# Patient Record
Sex: FEMALE | Race: WHITE | NOT HISPANIC OR LATINO | Employment: FULL TIME | URBAN - METROPOLITAN AREA
[De-identification: names, ages, dates, MRNs, and addresses within clinical notes are randomized per-mention and may not be internally consistent; named-entity substitution may affect disease eponyms.]

---
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
## 2024-02-05 ENCOUNTER — TELEPHONE (OUTPATIENT)
Dept: GASTROENTEROLOGY | Facility: CLINIC | Age: 19
End: 2024-02-05
Payer: COMMERCIAL

## 2024-02-05 NOTE — TELEPHONE ENCOUNTER
I called patient and informed her García from [facility] has been trying to reach her concerning order for Dupixent. I gave her Bola contact number at [phone] and instructed her to call him. Patient verbalized understanding.

## 2024-03-08 ENCOUNTER — OFFICE VISIT (OUTPATIENT)
Dept: FAMILY MEDICINE CLINIC | Facility: CLINIC | Age: 19
End: 2024-03-08
Payer: COMMERCIAL

## 2024-03-08 VITALS
BODY MASS INDEX: 23.32 KG/M2 | TEMPERATURE: 98.4 F | WEIGHT: 136.6 LBS | HEIGHT: 64 IN | DIASTOLIC BLOOD PRESSURE: 60 MMHG | OXYGEN SATURATION: 99 % | HEART RATE: 97 BPM | SYSTOLIC BLOOD PRESSURE: 122 MMHG

## 2024-03-08 DIAGNOSIS — K20.0 EOSINOPHILIC ESOPHAGITIS: ICD-10-CM

## 2024-03-08 DIAGNOSIS — Z34.90 PREGNANCY, UNSPECIFIED GESTATIONAL AGE: Primary | ICD-10-CM

## 2024-03-08 DIAGNOSIS — E55.9 VITAMIN D DEFICIENCY: ICD-10-CM

## 2024-03-08 PROBLEM — N92.6 MISSED PERIOD: Status: ACTIVE | Noted: 2024-03-08

## 2024-03-08 LAB
B-HCG UR QL: POSITIVE
EXPIRATION DATE: ABNORMAL
INTERNAL NEGATIVE CONTROL: ABNORMAL
INTERNAL POSITIVE CONTROL: ABNORMAL
Lab: ABNORMAL

## 2024-03-08 NOTE — PATIENT INSTRUCTIONS
Unisom (doxylamine) 12.5 to 25 mg once daily at bedtime and B6 12.5 to 25 mg 3 to 4 times per day

## 2024-03-08 NOTE — PROGRESS NOTES
"    Office Note     Name: Carol Ann Dominique    : 2005     MRN: [id number]     Chief Complaint  Labs Only and Pregency    Subjective     History of Present Illness:  Carol Ann Dominique is a 18 y.o. female who presents today for multiple concerns.  Patient reports that her last menstrual period it was about 6 weeks ago, she is not sure exactly what date.  She did have a positive home pregnancy test.  She feels nauseous and has vomited once.    She stopped taking the Dupixent for her eosinophilic esophagitis because she felt nauseous on it.  She has started working with a naturopathic doctor and is currently on multiple supplements.  I discussed with her the importance of checking with this naturopathic doctor about the safety of these medications in pregnancy.  She said that her naturopathic doctor is aware that she is pregnant.  She would like to get some lab work done today to check on her eosinophilic esophagitis.           Objective     Past Medical History:   Diagnosis Date    Allergic     Anemia     GERD (gastroesophageal reflux disease) 2 years ago     Past Surgical History:   Procedure Laterality Date    EYE SURGERY      Tala     Family History   Problem Relation Age of Onset    Other Mother         Gallbladder issues    No Known Problems Father     Cancer Maternal Grandfather         Prostate and his mom  from thyroid cancer    Diabetes Maternal Grandmother         Type 2    Other Maternal Grandmother         Gallbladder    Diabetes Paternal Grandmother         Type 2    Hyperlipidemia Paternal Grandmother         She has high blood pressure, Her dad had high blood pressure as well and  from stroke    Cancer Maternal Aunt         Thyroid Cancer       Vital Signs  /60   Pulse 97   Temp 98.4 °F (36.9 °C) (Temporal)   Ht 162.6 cm (64.02\")   Wt 62 kg (136 lb 9.6 oz)   SpO2 99%   BMI 23.44 kg/m²   Estimated body mass index is 23.44 kg/m² as calculated from the following:    Height as " "of this encounter: 162.6 cm (64.02\").    Weight as of this encounter: 62 kg (136 lb 9.6 oz).    Physical Exam  Vitals reviewed.   Constitutional:       Appearance: Normal appearance.   Cardiovascular:      Rate and Rhythm: Normal rate.   Pulmonary:      Effort: Pulmonary effort is normal.   Abdominal:      General: Abdomen is flat.   Musculoskeletal:      Comments: Moving all extremities spontaneously   Neurological:      Mental Status: She is alert.      Comments: Alert and oriented   Psychiatric:         Mood and Affect: Mood normal.         Behavior: Behavior normal.               Assessment and Plan     Diagnoses and all orders for this visit:    1. Pregnancy, unspecified gestational age (Primary)  -     POC Pregnancy, Urine  -     CBC & Differential; Future  -     hCG, Quantitative, Pregnancy; Future  -     TSH Rfx On Abnormal To Free T4; Future    2. Eosinophilic esophagitis  -     Comprehensive Metabolic Panel; Future  -     CBC & Differential; Future    3. Vitamin D deficiency  -     Vitamin D,25-Hydroxy; Future      Patient with positive urine pregnancy test today.  Unsure of exact LMP date.  Will obtain beta-hCG level quantitatively.  Will also get CBC and TSH given that patient is on some supplements that might affect this.  I discussed with her to let her OBGYN know that she is pregnant and that she is on all of those supplements.    Patient with eosinophilic esophagitis and has stopped taking her Dupixent.  Obtaining labs for further evaluation.  Advised patient to follow-up with GI provider.     Patient with history of vitamin D deficiency, currently on vitamin D supplementation.  Obtaining vitamin D level for further evaluation.  Will follow-up on results and advise further.     Follow Up  No follow-ups on file.    Lee Ann Fenton MD  "

## 2024-03-14 ENCOUNTER — LAB (OUTPATIENT)
Dept: LAB | Facility: HOSPITAL | Age: 19
End: 2024-03-14
Payer: COMMERCIAL

## 2024-03-14 DIAGNOSIS — Z34.90 PREGNANCY, UNSPECIFIED GESTATIONAL AGE: ICD-10-CM

## 2024-03-14 DIAGNOSIS — E55.9 VITAMIN D DEFICIENCY: ICD-10-CM

## 2024-03-14 DIAGNOSIS — K20.0 EOSINOPHILIC ESOPHAGITIS: ICD-10-CM

## 2024-03-14 LAB
BASOPHILS # BLD AUTO: 0.02 10*3/MM3 (ref 0–0.2)
BASOPHILS NFR BLD AUTO: 0.4 % (ref 0–1.5)
DEPRECATED RDW RBC AUTO: 41.4 FL (ref 37–54)
EOSINOPHIL # BLD AUTO: 0.23 10*3/MM3 (ref 0–0.4)
EOSINOPHIL NFR BLD AUTO: 4.9 % (ref 0.3–6.2)
ERYTHROCYTE [DISTWIDTH] IN BLOOD BY AUTOMATED COUNT: 13.1 % (ref 12.3–15.4)
HCT VFR BLD AUTO: 36.6 % (ref 34–46.6)
HGB BLD-MCNC: 12.2 G/DL (ref 12–15.9)
IMM GRANULOCYTES # BLD AUTO: 0.01 10*3/MM3 (ref 0–0.05)
IMM GRANULOCYTES NFR BLD AUTO: 0.2 % (ref 0–0.5)
LYMPHOCYTES # BLD AUTO: 1.69 10*3/MM3 (ref 0.7–3.1)
LYMPHOCYTES NFR BLD AUTO: 35.8 % (ref 19.6–45.3)
MCH RBC QN AUTO: 29.1 PG (ref 26.6–33)
MCHC RBC AUTO-ENTMCNC: 33.3 G/DL (ref 31.5–35.7)
MCV RBC AUTO: 87.4 FL (ref 79–97)
MONOCYTES # BLD AUTO: 0.35 10*3/MM3 (ref 0.1–0.9)
MONOCYTES NFR BLD AUTO: 7.4 % (ref 5–12)
NEUTROPHILS NFR BLD AUTO: 2.42 10*3/MM3 (ref 1.7–7)
NEUTROPHILS NFR BLD AUTO: 51.3 % (ref 42.7–76)
NRBC BLD AUTO-RTO: 0 /100 WBC (ref 0–0.2)
PLATELET # BLD AUTO: 254 10*3/MM3 (ref 140–450)
PMV BLD AUTO: 11.1 FL (ref 6–12)
RBC # BLD AUTO: 4.19 10*6/MM3 (ref 3.77–5.28)
TSH SERPL DL<=0.05 MIU/L-ACNC: 2.01 UIU/ML (ref 0.27–4.2)
WBC NRBC COR # BLD AUTO: 4.72 10*3/MM3 (ref 3.4–10.8)

## 2024-03-14 PROCEDURE — 80050 GENERAL HEALTH PANEL: CPT

## 2024-03-14 PROCEDURE — 36415 COLL VENOUS BLD VENIPUNCTURE: CPT

## 2024-03-14 PROCEDURE — 82306 VITAMIN D 25 HYDROXY: CPT

## 2024-03-14 PROCEDURE — 84702 CHORIONIC GONADOTROPIN TEST: CPT

## 2024-03-15 LAB
25(OH)D3 SERPL-MCNC: 20.2 NG/ML (ref 30–100)
ALBUMIN SERPL-MCNC: 4.7 G/DL (ref 3.5–5.2)
ALBUMIN/GLOB SERPL: 1.8 G/DL
ALP SERPL-CCNC: 65 U/L (ref 39–117)
ALT SERPL W P-5'-P-CCNC: 14 U/L (ref 1–33)
ANION GAP SERPL CALCULATED.3IONS-SCNC: 12 MMOL/L (ref 5–15)
AST SERPL-CCNC: 16 U/L (ref 1–32)
BILIRUB SERPL-MCNC: 0.5 MG/DL (ref 0–1.2)
BUN SERPL-MCNC: 7 MG/DL (ref 6–20)
BUN/CREAT SERPL: 12.5 (ref 7–25)
CALCIUM SPEC-SCNC: 9.2 MG/DL (ref 8.6–10.5)
CHLORIDE SERPL-SCNC: 101 MMOL/L (ref 98–107)
CO2 SERPL-SCNC: 23 MMOL/L (ref 22–29)
CREAT SERPL-MCNC: 0.56 MG/DL (ref 0.57–1)
EGFRCR SERPLBLD CKD-EPI 2021: 135 ML/MIN/1.73
GLOBULIN UR ELPH-MCNC: 2.6 GM/DL
GLUCOSE SERPL-MCNC: 71 MG/DL (ref 65–99)
HCG INTACT+B SERPL-ACNC: NORMAL MIU/ML
POTASSIUM SERPL-SCNC: 4.7 MMOL/L (ref 3.5–5.2)
PROT SERPL-MCNC: 7.3 G/DL (ref 6–8.5)
SODIUM SERPL-SCNC: 136 MMOL/L (ref 136–145)

## 2024-03-21 ENCOUNTER — LAB (OUTPATIENT)
Dept: LAB | Facility: HOSPITAL | Age: 19
End: 2024-03-21
Payer: COMMERCIAL

## 2024-03-21 ENCOUNTER — OFFICE VISIT (OUTPATIENT)
Dept: OBSTETRICS AND GYNECOLOGY | Facility: CLINIC | Age: 19
End: 2024-03-21
Payer: COMMERCIAL

## 2024-03-21 VITALS
BODY MASS INDEX: 22.88 KG/M2 | DIASTOLIC BLOOD PRESSURE: 62 MMHG | WEIGHT: 134 LBS | SYSTOLIC BLOOD PRESSURE: 118 MMHG | HEIGHT: 64 IN

## 2024-03-21 DIAGNOSIS — Z3A.01 7 WEEKS GESTATION OF PREGNANCY: Primary | ICD-10-CM

## 2024-03-21 DIAGNOSIS — Z3A.01 7 WEEKS GESTATION OF PREGNANCY: ICD-10-CM

## 2024-03-21 LAB
ABO GROUP BLD: NORMAL
RH BLD: POSITIVE

## 2024-03-21 PROCEDURE — 86901 BLOOD TYPING SEROLOGIC RH(D): CPT

## 2024-03-21 PROCEDURE — 86900 BLOOD TYPING SEROLOGIC ABO: CPT

## 2024-03-21 RX ORDER — EPINEPHRINE 0.15 MG/.3ML
INJECTION INTRAMUSCULAR
COMMUNITY
Start: 2024-02-01

## 2024-03-21 NOTE — PROGRESS NOTES
"Subjective   Chief Complaint   Patient presents with    confirmation of pregnancy      Carol Ann Dominique is a 18 y.o. year old No obstetric history on file..  Patient's last menstrual period was 01/26/2024 (exact date).  She presents for confirmation of pregnancy. She is currently exeriencing nausea, but has only vomited about 2x. She has not yet gotten the Unisom and B6. She has not had any vaginal bleeding. She does not know her blood type. She is compliant with a daily prenatal vitamin. She is on a long list of natural supplements prescribed by a homeopath.     The following portions of the patient's history were reviewed and updated as appropriate:current medications, allergies, and past medical history    Social History    Tobacco Use      Smoking status: Never        Passive exposure: Never      Smokeless tobacco: Never         Objective   /62   Ht 162.6 cm (64\")   Wt 60.8 kg (134 lb)   LMP 01/26/2024 (Exact Date)   BMI 23.00 kg/m²     General:  well developed; well nourished  no acute distress   Lungs:  breathing is unlabored   Heart:  Not performed.     Lab Review   No data reviewed    Imaging   No data reviewed        Assessment   IUP at 7w5d   Nausea      Plan   Discussed normal-appearing intrauterine pregnancy at 7 weeks, congratulations provided.  Discussed presence of subchorionic hemorrhage, and to call for any bleeding in the future.  ABO/Rh ordered.  Recommend vitamin B6 and Unisom, however if symptoms persist or worsen, patient to call the office for alternative medications.  Continue daily prenatal vitamin.  Discussed healthy diet and exercise choices in pregnancy.  The importance of keeping all planned follow-up and taking all medications as prescribed was emphasized.  Follow up for NOB appointment with a different provider or sooner PRN     No orders of the defined types were placed in this encounter.         This note was electronically signed.    Deanna Jerez MD  March 21, 2024     "

## 2024-03-25 ENCOUNTER — PATIENT ROUNDING (BHMG ONLY) (OUTPATIENT)
Dept: OBSTETRICS AND GYNECOLOGY | Facility: CLINIC | Age: 19
End: 2024-03-25
Payer: COMMERCIAL

## 2024-03-25 NOTE — PROGRESS NOTES
A [employer] message has been sent to the patient [place] [facility] [place]

## 2024-04-13 PROBLEM — R42 VERTIGO: Status: RESOLVED | Noted: 2023-08-14 | Resolved: 2024-04-13

## 2024-04-13 PROBLEM — J02.9 SORE THROAT: Status: RESOLVED | Noted: 2022-09-19 | Resolved: 2024-04-13

## 2024-04-13 PROBLEM — R51.9 HEADACHE, UNSPECIFIED HEADACHE TYPE: Status: RESOLVED | Noted: 2023-08-14 | Resolved: 2024-04-13

## 2024-04-13 PROBLEM — J30.9 ALLERGIC RHINITIS: Status: RESOLVED | Noted: 2022-09-19 | Resolved: 2024-04-13

## 2024-04-13 PROBLEM — M54.2 NECK PAIN: Status: RESOLVED | Noted: 2023-08-14 | Resolved: 2024-04-13

## 2024-04-13 PROBLEM — R10.11 POSTPRANDIAL RUQ PAIN: Status: RESOLVED | Noted: 2021-04-02 | Resolved: 2024-04-13

## 2024-04-13 PROBLEM — R63.4 WEIGHT LOSS: Status: RESOLVED | Noted: 2021-04-21 | Resolved: 2024-04-13

## 2024-04-13 PROBLEM — R55 SYNCOPE AND COLLAPSE: Status: RESOLVED | Noted: 2023-08-14 | Resolved: 2024-04-13

## 2024-04-13 PROBLEM — E61.1 IRON DEFICIENCY: Status: RESOLVED | Noted: 2022-09-19 | Resolved: 2024-04-13

## 2024-04-13 PROBLEM — R10.84 GENERALIZED ABDOMINAL PAIN: Status: RESOLVED | Noted: 2021-03-11 | Resolved: 2024-04-13

## 2024-04-13 PROBLEM — E55.9 VITAMIN D DEFICIENCY: Status: RESOLVED | Noted: 2024-03-08 | Resolved: 2024-04-13

## 2024-04-13 PROBLEM — N92.6 MISSED PERIOD: Status: RESOLVED | Noted: 2024-03-08 | Resolved: 2024-04-13

## 2024-04-13 PROBLEM — R10.12 LEFT UPPER QUADRANT PAIN: Status: RESOLVED | Noted: 2023-12-07 | Resolved: 2024-04-13

## 2024-04-15 ENCOUNTER — INITIAL PRENATAL (OUTPATIENT)
Dept: OBSTETRICS AND GYNECOLOGY | Facility: CLINIC | Age: 19
End: 2024-04-15
Payer: COMMERCIAL

## 2024-04-15 VITALS — BODY MASS INDEX: 23.17 KG/M2 | DIASTOLIC BLOOD PRESSURE: 64 MMHG | WEIGHT: 135 LBS | SYSTOLIC BLOOD PRESSURE: 122 MMHG

## 2024-04-15 DIAGNOSIS — Z34.90 PRENATAL CARE, ANTEPARTUM: Primary | ICD-10-CM

## 2024-04-15 RX ORDER — PRENATAL VIT NO.126/IRON/FOLIC 28MG-0.8MG
TABLET ORAL DAILY
COMMUNITY

## 2024-04-15 NOTE — PROGRESS NOTES
Initial ob visit     CC- Here for care of pregnancy        Carol Ann Dominique is a 18 y.o. female, , who presents for her first obstetrical visit.  Patient's last menstrual period was 2024.. Her ESME is 10/28/2024, by Last Menstrual Period. Current GA is 12w0d.     Initial positive test date : mid March, unsure of exact date, UPT     Her periods are every 26 day.  Prior obstetric issues: none  Patient's past medical history is significant for:  none .  Family history of genetic issues (includes FOB): none  Prior infections concerning in pregnancy (Rash, fever in last 2 weeks): No  Varicella Hx - vaccinated  Prior testing for Cystic Fibrosis Carrier or Sickle Cell Trait- none  Prepregnancy BMI - Body mass index is 23.17 kg/m².  History of STD: no  Hx of HSV for patient or partner: no  US done today: Yes.  Findings showed Single intrauterine pregnancy measuring 12 weeks today which is consistent with her last menstrual period.  Positive fetal heart tones..  I have personally evaluated the U/S and agree with the findings. Isis Rodas MD      OB History    Para Term  AB Living   1             SAB IAB Ectopic Molar Multiple Live Births                    # Outcome Date GA Lbr Kendall/2nd Weight Sex Type Anes PTL Lv   1 Current                Additional Pertinent History   Last Pap : n/a     Last Completed Pap Smear       This patient has no relevant Health Maintenance data.          History of abnormal Pap smear:  n/a  Family history of uterine, colon, breast, or ovarian cancer: no  Feelings of Anxiety or Depression: no  Tobacco Usage?: No   Alcohol/Drug Use?: NO  Over the age of 35 at delivery: no  Genetic Screening: desires no genetic testing  Flu Status: Declines    PMH    Current Outpatient Medications:     COD LIVER OIL PO, Take  by mouth., Disp: , Rfl:     fluticasone (Flonase) 50 MCG/ACT nasal spray, 2 sprays into the nostril(s) as directed by provider Daily., Disp: 16 g, Rfl: 5     NON FORMULARY, Scutellania supreme, reishi mushroom comlex, theanine, disgetive enzyme, Disp: , Rfl:     prenatal vitamin (prenatal, CLASSIC, vitamin) tablet, Take  by mouth Daily., Disp: , Rfl:     VITAMIN D PO, Take  by mouth., Disp: , Rfl:     Dupilumab (Dupixent) 300 MG/2ML solution pen-injector, Inject 2 mL under the skin into the appropriate area as directed 1 (One) Time Per Week. (Patient not taking: Reported on 4/15/2024), Disp: 8 mL, Rfl: 11     Past Medical History:   Diagnosis Date    Allergic     Anemia     Eosinophilic esophagitis     GERD (gastroesophageal reflux disease) 2 years ago        Past Surgical History:   Procedure Laterality Date    EYE SURGERY  6/23    Lasik    WISDOM TOOTH EXTRACTION  5/25       Review of Systems   Review of Systems    Patient Reports:  nausea and abdominal pain/cramping  Patient Denies:excessive nausea , excessive vomiting, and vaginal bleeding  All systems reviewed and otherwise normal.    I have reviewed and agree with the HPI, ROS, and historical information as entered above. Isis Rodas MD      /64   Wt 61.2 kg (135 lb)   LMP 01/22/2024   BMI 23.17 kg/m²     The additional following portions of the patient's history were reviewed and updated as appropriate: allergies, current medications, past family history, past medical history, past social history, past surgical history, and problem list.    Physical Exam  General:  well developed; well nourished  no acute distress   Chest/Respiratory: No labored breathing, normal respiratory effort, normal appearance, no respiratory noises noted   Heart:  normal rate, regular rhythm,  no murmurs, rubs, or gallops   Thyroid: normal to inspection and palpation   Breasts:  Not performed.   Abdomen: soft, non-tender; no masses  no umbilical or inguinal hernias are present  no hepato-splenomegaly   Pelvis: Not performed.        Assessment and Plan    Problem List Items Addressed This Visit    None  Visit Diagnoses        Prenatal care, antepartum    -  Primary    Relevant Orders    Obstetric Panel    HIV-1 / O / 2 Ag / Antibody    Urine Culture - Urine, Urine, Clean Catch    Urinalysis With Microscopic - Urine, Clean Catch    Chlamydia trachomatis, Neisseria gonorrhoeae, PCR - Urine, Urine, Clean Catch            Pregnancy at 12w0d  Reviewed routine prenatal care with the office and educational materials given  Lab(s) Ordered  Discussed options for genetic testing including first trimester nuchal translucency screen, genetic disease carrier testing, quadruple screen, and NIPT  Patient is on Prenatal vitamins  Return in about 4 weeks (around 5/13/2024).      Isis Rodas MD  04/15/2024

## 2024-04-16 LAB
ABO GROUP BLD: NORMAL
APPEARANCE UR: CLEAR
BACTERIA #/AREA URNS HPF: NORMAL /[HPF]
BASOPHILS # BLD AUTO: 0 X10E3/UL (ref 0–0.2)
BASOPHILS NFR BLD AUTO: 0 %
BILIRUB UR QL STRIP: NEGATIVE
BLD GP AB SCN SERPL QL: NEGATIVE
CASTS URNS QL MICRO: NORMAL /LPF
COLOR UR: YELLOW
EOSINOPHIL # BLD AUTO: 0.4 X10E3/UL (ref 0–0.4)
EOSINOPHIL NFR BLD AUTO: 6 %
EPI CELLS #/AREA URNS HPF: NORMAL /HPF (ref 0–10)
ERYTHROCYTE [DISTWIDTH] IN BLOOD BY AUTOMATED COUNT: 12.9 % (ref 11.7–15.4)
GLUCOSE UR QL STRIP: NEGATIVE
HBV SURFACE AG SERPL QL IA: NEGATIVE
HCT VFR BLD AUTO: 34.6 % (ref 34–46.6)
HCV IGG SERPL QL IA: NON REACTIVE
HGB BLD-MCNC: 11.6 G/DL (ref 11.1–15.9)
HGB UR QL STRIP: NEGATIVE
HIV 1+2 AB+HIV1 P24 AG SERPL QL IA: NON REACTIVE
IMM GRANULOCYTES # BLD AUTO: 0 X10E3/UL (ref 0–0.1)
IMM GRANULOCYTES NFR BLD AUTO: 0 %
KETONES UR QL STRIP: NEGATIVE
LEUKOCYTE ESTERASE UR QL STRIP: NEGATIVE
LYMPHOCYTES # BLD AUTO: 1.4 X10E3/UL (ref 0.7–3.1)
LYMPHOCYTES NFR BLD AUTO: 23 %
MCH RBC QN AUTO: 30.1 PG (ref 26.6–33)
MCHC RBC AUTO-ENTMCNC: 33.5 G/DL (ref 31.5–35.7)
MCV RBC AUTO: 90 FL (ref 79–97)
MICRO URNS: ABNORMAL
MICRO URNS: ABNORMAL
MONOCYTES # BLD AUTO: 0.4 X10E3/UL (ref 0.1–0.9)
MONOCYTES NFR BLD AUTO: 6 %
NEUTROPHILS # BLD AUTO: 3.8 X10E3/UL (ref 1.4–7)
NEUTROPHILS NFR BLD AUTO: 65 %
NITRITE UR QL STRIP: NEGATIVE
PH UR STRIP: 7 [PH] (ref 5–7.5)
PLATELET # BLD AUTO: 222 X10E3/UL (ref 150–450)
PROT UR QL STRIP: NEGATIVE
RBC # BLD AUTO: 3.86 X10E6/UL (ref 3.77–5.28)
RBC #/AREA URNS HPF: NORMAL /HPF (ref 0–2)
RH BLD: POSITIVE
RPR SER QL: NON REACTIVE
RUBV IGG SERPL IA-ACNC: 5.08 INDEX
SP GR UR STRIP: <=1.005 (ref 1–1.03)
UROBILINOGEN UR STRIP-MCNC: 0.2 MG/DL (ref 0.2–1)
WBC # BLD AUTO: 5.9 X10E3/UL (ref 3.4–10.8)
WBC #/AREA URNS HPF: NORMAL /HPF (ref 0–5)

## 2024-04-17 LAB
BACTERIA UR CULT: NO GROWTH
BACTERIA UR CULT: NORMAL
C TRACH RRNA SPEC QL NAA+PROBE: NEGATIVE
N GONORRHOEA RRNA SPEC QL NAA+PROBE: NEGATIVE

## 2024-04-26 ENCOUNTER — LAB (OUTPATIENT)
Dept: LAB | Facility: HOSPITAL | Age: 19
End: 2024-04-26
Payer: COMMERCIAL

## 2024-04-26 ENCOUNTER — OFFICE VISIT (OUTPATIENT)
Dept: FAMILY MEDICINE CLINIC | Facility: CLINIC | Age: 19
End: 2024-04-26
Payer: COMMERCIAL

## 2024-04-26 VITALS
BODY MASS INDEX: 22.71 KG/M2 | HEIGHT: 64 IN | HEART RATE: 75 BPM | SYSTOLIC BLOOD PRESSURE: 118 MMHG | WEIGHT: 133 LBS | TEMPERATURE: 98 F | DIASTOLIC BLOOD PRESSURE: 58 MMHG | OXYGEN SATURATION: 99 %

## 2024-04-26 DIAGNOSIS — R42 LIGHTHEADEDNESS: ICD-10-CM

## 2024-04-26 DIAGNOSIS — Z00.00 ANNUAL PHYSICAL EXAM: Primary | ICD-10-CM

## 2024-04-26 PROCEDURE — 80050 GENERAL HEALTH PANEL: CPT

## 2024-04-26 RX ORDER — AZELASTINE 1 MG/ML
SPRAY, METERED NASAL
COMMUNITY
Start: 2024-04-01

## 2024-04-26 NOTE — ASSESSMENT & PLAN NOTE
- Patient has been feeling more lightheaded recently, do suspect secondary to her pregnancy, possibly some dehydration  - Obtaining labs to rule out other potential etiologies, will follow-up on results and advise further

## 2024-04-26 NOTE — PATIENT INSTRUCTIONS
Health Maintenance, Female  Adopting a healthy lifestyle and getting preventive care can go a long way to promote health and wellness. Talk with your health care provider about what schedule of regular examinations is right for you. This is a good chance for you to check in with your provider about disease prevention and staying healthy.  In between checkups, there are plenty of things you can do on your own. Experts have done a lot of research about which lifestyle changes and preventive measures are most likely to keep you healthy. Ask your health care provider for more information.  Weight and diet  Eat a healthy diet  Be sure to include plenty of vegetables, fruits, low-fat dairy products, and lean protein.  Do not eat a lot of foods high in solid fats, added sugars, or salt.  Get regular exercise. This is one of the most important things you can do for your health.  Most adults should exercise for at least 150 minutes each week. The exercise should increase your heart rate and make you sweat (moderate-intensity exercise).  Most adults should also do strengthening exercises at least twice a week. This is in addition to the moderate-intensity exercise.     Maintain a healthy weight  Body mass index (BMI) is a measurement that can be used to identify possible weight problems. It estimates body fat based on height and weight. Your health care provider can help determine your BMI and help you achieve or maintain a healthy weight.  For females 20 years of age and older:  A BMI below 18.5 is considered underweight.  A BMI of 18.5 to 24.9 is normal.  A BMI of 25 to 29.9 is considered overweight.  A BMI of 30 and above is considered obese.     Watch levels of cholesterol and blood lipids  You should start having your blood tested for lipids and cholesterol at 20 years of age, then have this test every 5 years.  You may need to have your cholesterol levels checked more often if:  Your lipid or cholesterol levels are  high.  You are older than 50 years of age.  You are at high risk for heart disease.     Cancer screening  Lung Cancer  Lung cancer screening is recommended for adults 55-80 years old who are at high risk for lung cancer because of a history of smoking.  A yearly low-dose CT scan of the lungs is recommended for people who:  Currently smoke.  Have quit within the past 15 years.  Have at least a 30-pack-year history of smoking. A pack year is smoking an average of one pack of cigarettes a day for 1 year.  Yearly screening should continue until it has been 15 years since you quit.  Yearly screening should stop if you develop a health problem that would prevent you from having lung cancer treatment.     Breast Cancer  Practice breast self-awareness. This means understanding how your breasts normally appear and feel.  It also means doing regular breast self-exams. Let your health care provider know about any changes, no matter how small.  If you are in your 20s or 30s, you should have a clinical breast exam (CBE) by a health care provider every 1-3 years as part of a regular health exam.  If you are 40 or older, have a CBE every year. Also consider having a breast X-ray (mammogram) every year.  If you have a family history of breast cancer, talk to your health care provider about genetic screening.  If you are at high risk for breast cancer, talk to your health care provider about having an MRI and a mammogram every year.  Breast cancer gene (BRCA) assessment is recommended for women who have family members with BRCA-related cancers. BRCA-related cancers include:  Breast.  Ovarian.  Tubal.  Peritoneal cancers.  Results of the assessment will determine the need for genetic counseling and BRCA1 and BRCA2 testing.     Cervical Cancer  Your health care provider may recommend that you be screened regularly for cancer of the pelvic organs (ovaries, uterus, and vagina). This screening involves a pelvic examination, including  checking for microscopic changes to the surface of your cervix (Pap test). You may be encouraged to have this screening done every 3 years, beginning at age 21.  For women ages 30-65, health care providers may recommend pelvic exams and Pap testing every 3 years, or they may recommend the Pap and pelvic exam, combined with testing for human papilloma virus (HPV), every 5 years. Some types of HPV increase your risk of cervical cancer. Testing for HPV may also be done on women of any age with unclear Pap test results.  Other health care providers may not recommend any screening for nonpregnant women who are considered low risk for pelvic cancer and who do not have symptoms. Ask your health care provider if a screening pelvic exam is right for you.  If you have had past treatment for cervical cancer or a condition that could lead to cancer, you need Pap tests and screening for cancer for at least 20 years after your treatment. If Pap tests have been discontinued, your risk factors (such as having a new sexual partner) need to be reassessed to determine if screening should resume. Some women have medical problems that increase the chance of getting cervical cancer. In these cases, your health care provider may recommend more frequent screening and Pap tests.     Colorectal Cancer  This type of cancer can be detected and often prevented.  Routine colorectal cancer screening usually begins at 50 years of age and continues through 75 years of age.  Your health care provider may recommend screening at an earlier age if you have risk factors for colon cancer.  Your health care provider may also recommend using home test kits to check for hidden blood in the stool.  A small camera at the end of a tube can be used to examine your colon directly (sigmoidoscopy or colonoscopy). This is done to check for the earliest forms of colorectal cancer.  Routine screening usually begins at age 50.  Direct examination of the colon should  be repeated every 5-10 years through 75 years of age. However, you may need to be screened more often if early forms of precancerous polyps or small growths are found.     Skin Cancer  Check your skin from head to toe regularly.  Tell your health care provider about any new moles or changes in moles, especially if there is a change in a mole's shape or color.  Also tell your health care provider if you have a mole that is larger than the size of a pencil eraser.  Always use sunscreen. Apply sunscreen liberally and repeatedly throughout the day.  Protect yourself by wearing long sleeves, pants, a wide-brimmed hat, and sunglasses whenever you are outside.     Heart disease, diabetes, and high blood pressure  High blood pressure causes heart disease and increases the risk of stroke. High blood pressure is more likely to develop in:  People who have blood pressure in the high end of the normal range (130-139/85-89 mm Hg).  People who are overweight or obese.  People who are .  If you are 18-39 years of age, have your blood pressure checked every 3-5 years. If you are 40 years of age or older, have your blood pressure checked every year. You should have your blood pressure measured twice--once when you are at a hospital or clinic, and once when you are not at a hospital or clinic. Record the average of the two measurements. To check your blood pressure when you are not at a hospital or clinic, you can use:  An automated blood pressure machine at a pharmacy.  A home blood pressure monitor.  If you are between 55 years and 79 years old, ask your health care provider if you should take aspirin to prevent strokes.  Have regular diabetes screenings. This involves taking a blood sample to check your fasting blood sugar level.  If you are at a normal weight and have a low risk for diabetes, have this test once every three years after 45 years of age.  If you are overweight and have a high risk for diabetes,  consider being tested at a younger age or more often.  Preventing infection  Hepatitis B  If you have a higher risk for hepatitis B, you should be screened for this virus. You are considered at high risk for hepatitis B if:  You were born in a country where hepatitis B is common. Ask your health care provider which countries are considered high risk.  Your parents were born in a high-risk country, and you have not been immunized against hepatitis B (hepatitis B vaccine).  You have HIV or AIDS.  You use needles to inject street drugs.  You live with someone who has hepatitis B.  You have had sex with someone who has hepatitis B.  You get hemodialysis treatment.  You take certain medicines for conditions, including cancer, organ transplantation, and autoimmune conditions.     Hepatitis C  Blood testing is recommended for:  Everyone born from 1945 through 1965.  Anyone with known risk factors for hepatitis C.     Sexually transmitted infections (STIs)  You should be screened for sexually transmitted infections (STIs) including gonorrhea and chlamydia if:  You are sexually active and are younger than 24 years of age.  You are older than 24 years of age and your health care provider tells you that you are at risk for this type of infection.  Your sexual activity has changed since you were last screened and you are at an increased risk for chlamydia or gonorrhea. Ask your health care provider if you are at risk.  If you do not have HIV, but are at risk, it may be recommended that you take a prescription medicine daily to prevent HIV infection. This is called pre-exposure prophylaxis (PrEP). You are considered at risk if:  You are sexually active and do not regularly use condoms or know the HIV status of your partner(s).  You take drugs by injection.  You are sexually active with a partner who has HIV.     Talk with your health care provider about whether you are at high risk of being infected with HIV. If you choose to  begin PrEP, you should first be tested for HIV. You should then be tested every 3 months for as long as you are taking PrEP.  Pregnancy  If you are premenopausal and you may become pregnant, ask your health care provider about preconception counseling.  If you may become pregnant, take 400 to 800 micrograms (mcg) of folic acid every day.  If you want to prevent pregnancy, talk to your health care provider about birth control (contraception).  Osteoporosis and menopause  Osteoporosis is a disease in which the bones lose minerals and strength with aging. This can result in serious bone fractures. Your risk for osteoporosis can be identified using a bone density scan.  If you are 65 years of age or older, or if you are at risk for osteoporosis and fractures, ask your health care provider if you should be screened.  Ask your health care provider whether you should take a calcium or vitamin D supplement to lower your risk for osteoporosis.  Menopause may have certain physical symptoms and risks.  Hormone replacement therapy may reduce some of these symptoms and risks.  Talk to your health care provider about whether hormone replacement therapy is right for you.  Follow these instructions at home:  Schedule regular health, dental, and eye exams.  Stay current with your immunizations.  Do not use any tobacco products including cigarettes, chewing tobacco, or electronic cigarettes.  If you are pregnant, do not drink alcohol.  If you are breastfeeding, limit how much and how often you drink alcohol.  Limit alcohol intake to no more than 1 drink per day for nonpregnant women. One drink equals 12 ounces of beer, 5 ounces of wine, or 1½ ounces of hard liquor.  Do not use street drugs.  Do not share needles.  Ask your health care provider for help if you need support or information about quitting drugs.  Tell your health care provider if you often feel depressed.  Tell your health care provider if you have ever been abused or do  not feel safe at home.  This information is not intended to replace advice given to you by your health care provider. Make sure you discuss any questions you have with your health care provider.  Document Released: 07/02/2012 Document Revised: 05/25/2017 Document Reviewed: 09/20/2016  [employer] Patient Education © 2018 [employer]

## 2024-04-26 NOTE — PROGRESS NOTES
"    Office Note     Name: Carol Ann Dominique    : 2005     MRN: [id number]     Chief Complaint  Annual Exam    Subjective     History of Present Illness:  Carol Ann Dominique is a 18 y.o. female who presents today for annual physical.  Patient is currently pregnant at 13.4 weeks.  She has establish care with gynecology and is currently on a prenatal vitamin.     Patient reports that for the last 1 to 2 weeks, she has felt more lightheaded.  Usually whenever she stands.  It does not happen when she goes from a sitting to standing position.  She feels that she drinks a good amount of water.  She would like to get labs done today.         Objective     Past Medical History:   Diagnosis Date    Allergic     Anemia     Eosinophilic esophagitis     GERD (gastroesophageal reflux disease) 2 years ago     Past Surgical History:   Procedure Laterality Date    EYE SURGERY      Lasik    WISDOM TOOTH EXTRACTION       Family History   Problem Relation Age of Onset    No Known Problems Father     Other Mother         Gallbladder issues    Diabetes Paternal Grandmother         Type 2    Hyperlipidemia Paternal Grandmother         She has high blood pressure, Her dad had high blood pressure as well and  from stroke    Diabetes Maternal Grandmother         Type 2    Other Maternal Grandmother         Gallbladder    Cancer Maternal Grandfather         Prostate and his mom  from thyroid cancer    Cancer Maternal Aunt         Thyroid Cancer    Breast cancer Neg Hx     Ovarian cancer Neg Hx     Uterine cancer Neg Hx     Colon cancer Neg Hx        Vital Signs  /58   Pulse 75   Temp 98 °F (36.7 °C) (Infrared)   Ht 162.6 cm (64.02\")   Wt 60.3 kg (133 lb)   SpO2 99%   BMI 22.82 kg/m²   Estimated body mass index is 22.82 kg/m² as calculated from the following:    Height as of this encounter: 162.6 cm (64.02\").    Weight as of this encounter: 60.3 kg (133 lb).    Physical Exam  Vitals reviewed.   Constitutional: "       Appearance: Normal appearance.   HENT:      Head: Normocephalic.      Right Ear: External ear normal.      Left Ear: External ear normal.      Nose: Nose normal.      Mouth/Throat:      Mouth: Mucous membranes are moist.   Eyes:      Extraocular Movements: Extraocular movements intact.   Cardiovascular:      Rate and Rhythm: Normal rate and regular rhythm.      Heart sounds: Normal heart sounds.   Pulmonary:      Effort: Pulmonary effort is normal. No respiratory distress.      Breath sounds: Normal breath sounds.   Abdominal:      General: Abdomen is flat.      Palpations: Abdomen is soft.   Musculoskeletal:      Cervical back: No rigidity.      Comments: Moving all extremities spontaneously   Skin:     General: Skin is warm and dry.   Neurological:      General: No focal deficit present.      Mental Status: She is alert and oriented to person, place, and time.   Psychiatric:         Mood and Affect: Mood normal.         Behavior: Behavior normal.               Assessment and Plan     Diagnoses and all orders for this visit:    1. Annual physical exam (Primary)  Assessment & Plan:  - Patient is currently pregnant, do recommend following a healthy diet and exercising as well as staying well-hydrated  - Patient will need Tdap at 27-36 weeks of pregnancy  -Additional information provided in AVS      2. Lightheadedness  Assessment & Plan:  - Patient has been feeling more lightheaded recently, do suspect secondary to her pregnancy, possibly some dehydration  - Obtaining labs to rule out other potential etiologies, will follow-up on results and advise further    Orders:  -     TSH Rfx On Abnormal To Free T4; Future  -     Comprehensive Metabolic Panel; Future  -     CBC & Differential; Future      Pediatric BMI = 63 %ile (Z= 0.34) based on CDC (Girls, 2-20 Years) BMI-for-age based on BMI available as of 4/26/2024.. BMI is within normal parameters. No other follow-up for BMI required.    Follow Up  No follow-ups on  file.    Lee Ann Fenton MD

## 2024-04-26 NOTE — ASSESSMENT & PLAN NOTE
- Patient is currently pregnant, do recommend following a healthy diet and exercising as well as staying well-hydrated  - Patient will need Tdap at 27-36 weeks of pregnancy  -Additional information provided in AVS

## 2024-04-27 LAB
ALBUMIN SERPL-MCNC: 4.8 G/DL (ref 3.5–5.2)
ALBUMIN/GLOB SERPL: 1.7 G/DL
ALP SERPL-CCNC: 64 U/L (ref 39–117)
ALT SERPL W P-5'-P-CCNC: 14 U/L (ref 1–33)
ANION GAP SERPL CALCULATED.3IONS-SCNC: 12 MMOL/L (ref 5–15)
AST SERPL-CCNC: 17 U/L (ref 1–32)
BASOPHILS # BLD AUTO: 0.02 10*3/MM3 (ref 0–0.2)
BASOPHILS NFR BLD AUTO: 0.2 % (ref 0–1.5)
BILIRUB SERPL-MCNC: 0.2 MG/DL (ref 0–1.2)
BUN SERPL-MCNC: 6 MG/DL (ref 6–20)
BUN/CREAT SERPL: 11.1 (ref 7–25)
CALCIUM SPEC-SCNC: 9.6 MG/DL (ref 8.6–10.5)
CHLORIDE SERPL-SCNC: 100 MMOL/L (ref 98–107)
CO2 SERPL-SCNC: 24 MMOL/L (ref 22–29)
CREAT SERPL-MCNC: 0.54 MG/DL (ref 0.57–1)
DEPRECATED RDW RBC AUTO: 43.9 FL (ref 37–54)
EGFRCR SERPLBLD CKD-EPI 2021: 136.2 ML/MIN/1.73
EOSINOPHIL # BLD AUTO: 0.52 10*3/MM3 (ref 0–0.4)
EOSINOPHIL NFR BLD AUTO: 5.8 % (ref 0.3–6.2)
ERYTHROCYTE [DISTWIDTH] IN BLOOD BY AUTOMATED COUNT: 13.3 % (ref 12.3–15.4)
GLOBULIN UR ELPH-MCNC: 2.8 GM/DL
GLUCOSE SERPL-MCNC: 66 MG/DL (ref 65–99)
HCT VFR BLD AUTO: 37 % (ref 34–46.6)
HGB BLD-MCNC: 12.6 G/DL (ref 12–15.9)
IMM GRANULOCYTES # BLD AUTO: 0.03 10*3/MM3 (ref 0–0.05)
IMM GRANULOCYTES NFR BLD AUTO: 0.3 % (ref 0–0.5)
LYMPHOCYTES # BLD AUTO: 1.52 10*3/MM3 (ref 0.7–3.1)
LYMPHOCYTES NFR BLD AUTO: 16.9 % (ref 19.6–45.3)
MCH RBC QN AUTO: 30.5 PG (ref 26.6–33)
MCHC RBC AUTO-ENTMCNC: 34.1 G/DL (ref 31.5–35.7)
MCV RBC AUTO: 89.6 FL (ref 79–97)
MONOCYTES # BLD AUTO: 0.47 10*3/MM3 (ref 0.1–0.9)
MONOCYTES NFR BLD AUTO: 5.2 % (ref 5–12)
NEUTROPHILS NFR BLD AUTO: 6.45 10*3/MM3 (ref 1.7–7)
NEUTROPHILS NFR BLD AUTO: 71.6 % (ref 42.7–76)
NRBC BLD AUTO-RTO: 0 /100 WBC (ref 0–0.2)
PLATELET # BLD AUTO: 262 10*3/MM3 (ref 140–450)
PMV BLD AUTO: 10.8 FL (ref 6–12)
POTASSIUM SERPL-SCNC: 3.8 MMOL/L (ref 3.5–5.2)
PROT SERPL-MCNC: 7.6 G/DL (ref 6–8.5)
RBC # BLD AUTO: 4.13 10*6/MM3 (ref 3.77–5.28)
SODIUM SERPL-SCNC: 136 MMOL/L (ref 136–145)
TSH SERPL DL<=0.05 MIU/L-ACNC: 1.52 UIU/ML (ref 0.27–4.2)
WBC NRBC COR # BLD AUTO: 9.01 10*3/MM3 (ref 3.4–10.8)

## 2024-05-08 ENCOUNTER — REFERRAL TRIAGE (OUTPATIENT)
Dept: LABOR AND DELIVERY | Facility: HOSPITAL | Age: 19
End: 2024-05-08
Payer: COMMERCIAL

## 2024-05-13 ENCOUNTER — ROUTINE PRENATAL (OUTPATIENT)
Dept: OBSTETRICS AND GYNECOLOGY | Facility: CLINIC | Age: 19
End: 2024-05-13
Payer: COMMERCIAL

## 2024-05-13 VITALS — BODY MASS INDEX: 23.3 KG/M2 | DIASTOLIC BLOOD PRESSURE: 72 MMHG | WEIGHT: 135.8 LBS | SYSTOLIC BLOOD PRESSURE: 108 MMHG

## 2024-05-13 DIAGNOSIS — Z34.90 PRENATAL CARE, ANTEPARTUM: Primary | ICD-10-CM

## 2024-05-13 DIAGNOSIS — Z91.018 FOOD ALLERGY: ICD-10-CM

## 2024-05-13 PROBLEM — Z00.00 ANNUAL PHYSICAL EXAM: Status: RESOLVED | Noted: 2024-04-26 | Resolved: 2024-05-13

## 2024-05-13 PROBLEM — R42 LIGHTHEADEDNESS: Status: RESOLVED | Noted: 2024-04-26 | Resolved: 2024-05-13

## 2024-05-13 LAB
GLUCOSE UR STRIP-MCNC: NEGATIVE MG/DL
PROT UR STRIP-MCNC: NEGATIVE MG/DL

## 2024-05-13 PROCEDURE — 99213 OFFICE O/P EST LOW 20 MIN: CPT | Performed by: OBSTETRICS & GYNECOLOGY

## 2024-05-13 RX ORDER — CETIRIZINE HYDROCHLORIDE 10 MG/1
TABLET ORAL
COMMUNITY
Start: 2024-04-29

## 2024-05-13 NOTE — PROGRESS NOTES
OB FOLLOW UP  CC- Here for care of pregnancy        Carol Ann Dominique is a 18 y.o.  16w0d patient being seen today for her obstetrical follow up visit. Patient reports consistent nausea daily and vomiting x 1 daily. Has been taking Unisom/B6 and has helped a little bit.     Her prenatal care is complicated by (and status) :   Patient Active Problem List   Diagnosis    GERD without esophagitis    Allergic conjunctivitis of both eyes    Eosinophilic esophagitis    Food allergy    Enlarged lymph nodes    Prenatal care    Pregnancy         Ultrasound Today: No  AFP: declines    ROS -   Patient Denies: leaking of fluid, vaginal bleeding, dysuria, and more than 6 contractions per hour  All other systems reviewed and are negative.       The additional following portions of the patient's history were reviewed and updated as appropriate: allergies, current medications, past family history, past medical history, past social history, past surgical history, and problem list.      I have reviewed and agree with the HPI, ROS, and historical information as entered above. Isis Rodas MD          EXAM:     Prenatal Vitals  BP: 108/72  Weight: 61.6 kg (135 lb 12.8 oz)   Fetal Heart Rate: 146         Urine Glucose Read-only: Negative  Urine Protein Read-only: Negative           Assessment and Plan    Problem List Items Addressed This Visit          Allergies and Adverse Reactions    Food allergy       Gravid and     Prenatal care - Primary    Relevant Orders    POC Urinalysis Dipstick (Completed)    US Ob 14 + Weeks Single or First Gestation       Pregnancy at 16w0d  Fetal status reassuring.   Discussed diet changes to help with nausea.  Counseled on MSAFP alone in relation to OTD and placental issues.    Anatomy scan next visit.   Activity and Exercise discussed.  Patient is on Prenatal vitamins  Return in about 4 weeks (around 6/10/2024) for anatomy usg.    Isis Rodas MD  2024

## 2024-05-24 ENCOUNTER — TELEPHONE (OUTPATIENT)
Dept: GASTROENTEROLOGY | Facility: CLINIC | Age: 19
End: 2024-05-24
Payer: COMMERCIAL

## 2024-05-24 NOTE — TELEPHONE ENCOUNTER
García from [facility]  (formerly [facility]) called and stated he has been unable to reach patient for Dupixent delivery. I called patient and no answer. I left her a voice message informing her the pharmacy is trying to reach her along with [facility] phone number. I also left our phone number to call if she has any issues or concerns.

## 2024-06-03 ENCOUNTER — PATIENT OUTREACH (OUTPATIENT)
Dept: LABOR AND DELIVERY | Facility: HOSPITAL | Age: 19
End: 2024-06-03
Payer: COMMERCIAL

## 2024-06-03 NOTE — OUTREACH NOTE
[facility]  Unable to Reach       Questions/Answers      Flowsheet Row Responses   Pending Outreach Confirm Patient Interest   Call Attempt First   Outcome No answer/busy, Left message   Next Call Attempt Date 06/11/24   Unable to reach comments: 2nd attempt next week              Genna Jacob RN  Maternity Nurse Navigator    6/3/2024, 09:42 EDT

## 2024-06-10 ENCOUNTER — ROUTINE PRENATAL (OUTPATIENT)
Dept: OBSTETRICS AND GYNECOLOGY | Facility: CLINIC | Age: 19
End: 2024-06-10
Payer: COMMERCIAL

## 2024-06-10 VITALS — SYSTOLIC BLOOD PRESSURE: 118 MMHG | WEIGHT: 141 LBS | BODY MASS INDEX: 24.19 KG/M2 | DIASTOLIC BLOOD PRESSURE: 72 MMHG

## 2024-06-10 DIAGNOSIS — Z34.90 PRENATAL CARE, ANTEPARTUM: Primary | ICD-10-CM

## 2024-06-10 PROCEDURE — 99213 OFFICE O/P EST LOW 20 MIN: CPT | Performed by: OBSTETRICS & GYNECOLOGY

## 2024-06-10 NOTE — PROGRESS NOTES
OB FOLLOW UP  CC- Here for care of pregnancy        Carol Ann Dominique is a 18 y.o.  20w0d patient being seen today for her obstetrical follow up visit. Patient reports no complaints.     Her prenatal care is complicated by (and status) :   Patient Active Problem List   Diagnosis    GERD without esophagitis    Allergic conjunctivitis of both eyes    Eosinophilic esophagitis    Food allergy    Enlarged lymph nodes    Prenatal care    Pregnancy         US done today: Yes.  Findings showed Male fetus in cephalic presentation fetal heart rate of 141.  Anterior placenta and three-vessel cord noted.  Normal fluid volume.  Estimated fetal weight 11 ounces with size being consistent with dates.  No fetal anomaly seen, however unable to adequately visualize cord insertion, cavum septum pellucid I, and heart views.  Cervical length 30 mm..  I have personally evaluated the U/S and agree with the findings. Isis Rodas MD    AFP was declined.    ROS -     Patient Denies: leaking of fluid, vaginal bleeding, dysuria, excessive vomiting, and more than 6 contractions per hour  Fetal Movement : Yes  All other systems reviewed and are negative.       The additional following portions of the patient's history were reviewed and updated as appropriate: allergies, current medications, past family history, past medical history, past social history, past surgical history, and problem list.      I have reviewed and agree with the HPI, ROS, and historical information as entered above. Isis Rodas MD      /72   Wt 64 kg (141 lb)   LMP 2024   BMI 24.19 kg/m²       EXAM:     Prenatal Vitals  BP: 118/72  Weight: 64 kg (141 lb)   Fetal Heart Rate: 141                  Assessment and Plan    Problem List Items Addressed This Visit          Gravid and     Prenatal care - Primary    Relevant Orders    US Ob Follow Up Transabdominal Approach       Pregnancy at 20w0d  Anatomy scan today is incomplete,  follow up in 4 weeks for additional views. Anatomy that was visualized was within normal limits.  Fetal status reassuring.   Activity and Exercise discussed.  Patient is on Prenatal vitamins  Return in about 4 weeks (around 7/8/2024) for ultrasound.      Isis Rodas MD  06/10/2024

## 2024-06-11 ENCOUNTER — PATIENT OUTREACH (OUTPATIENT)
Dept: LABOR AND DELIVERY | Facility: HOSPITAL | Age: 19
End: 2024-06-11
Payer: COMMERCIAL

## 2024-06-11 NOTE — OUTREACH NOTE
[facility]    Spoke with Carol Ann explaining the [facility] at length. She desires to read mychart letter we sent and she will contact us with her interest in joining or declining.     Genna Jacob RN  Maternity Nurse Navigator    6/11/2024, 13:53 EDT

## 2024-06-18 ENCOUNTER — PATIENT OUTREACH (OUTPATIENT)
Dept: LABOR AND DELIVERY | Facility: HOSPITAL | Age: 19
End: 2024-06-18
Payer: COMMERCIAL

## 2024-06-18 NOTE — OUTREACH NOTE
[facility]  Unable to Reach       Questions/Answers      Flowsheet Row Responses   Pending Outreach Confirm Patient Interest   Call Attempt Second   Outcome No answer/busy, Left message   Next Call Attempt Date 07/03/24   Unable to reach comments: 3rd attempt in 2 weeks.              Genna Jacob RN  Maternity Nurse Navigator    6/18/2024, 13:23 EDT

## 2024-06-18 NOTE — OUTREACH NOTE
[facility]  Enrollment    Current Estimated Gestational Age: 21w1d    Questions/Answers      Flowsheet Row Responses   Would like to participate? No            Contact info provided, encouraged to call if she has any questions, concerns, or needs assistance with resources.  DO Jacob RN  Maternity Nurse Navigator    6/18/2024, 14:41 EDT

## 2024-07-11 ENCOUNTER — ROUTINE PRENATAL (OUTPATIENT)
Dept: OBSTETRICS AND GYNECOLOGY | Facility: CLINIC | Age: 19
End: 2024-07-11
Payer: COMMERCIAL

## 2024-07-11 VITALS — BODY MASS INDEX: 25.05 KG/M2 | DIASTOLIC BLOOD PRESSURE: 80 MMHG | WEIGHT: 146 LBS | SYSTOLIC BLOOD PRESSURE: 122 MMHG

## 2024-07-11 DIAGNOSIS — Z34.90 PRENATAL CARE, ANTEPARTUM: Primary | ICD-10-CM

## 2024-07-11 LAB
GLUCOSE UR STRIP-MCNC: NEGATIVE MG/DL
PROT UR STRIP-MCNC: NEGATIVE MG/DL

## 2024-07-11 NOTE — PROGRESS NOTES
OB FOLLOW UP  CC- Here for care of pregnancy        Carol Ann Dominique is a 18 y.o.  24w3d patient being seen today for her obstetrical follow up visit. Patient reports left sided abdominal pain. States it has been the last 3 days, she describe it as a cramping like pain. It is intermittent but can last up to 1 hour when it occurs. She daily BMs.     Her prenatal care is complicated by (and status) :   Patient Active Problem List   Diagnosis    GERD without esophagitis    Allergic conjunctivitis of both eyes    Eosinophilic esophagitis    Food allergy    Enlarged lymph nodes    Prenatal care    Pregnancy         US done today: Yes.  Findings showed Male infant in cephalic presentation with FHR of 124. Anterior fundal placenta and 3 vessel cord noted. Normal fluid volume. Estimated fetal weight 1lb 7oz which is 39th%. Spine, 4ch, 3- vessel view, and diaphragm appears wnl. Unable to visualize 3-vessel trachea view, RVOT, and LVOT.  I have personally evaluated the U/S and agree with the findings. Isis Rodas MD    Reviewed 1 hr glucose testing and TDAP next visit.    ROS -   Patient Denies: leaking of fluid, vaginal bleeding, excessive vomiting, and more than 6 contractions per hour  Fetal Movement : normal  All other systems reviewed and are negative.       The additional following portions of the patient's history were reviewed and updated as appropriate: allergies, current medications, past family history, past medical history, past social history, past surgical history, and problem list.      I have reviewed and agree with the HPI, ROS, and historical information as entered above. Isis Rodas MD      /80   Wt 66.2 kg (146 lb)   LMP 2024   BMI 25.05 kg/m²       EXAM:     Prenatal Vitals  BP: 122/80  Weight: 66.2 kg (146 lb)   Fetal Heart Rate: 124               Urine Glucose Read-only: Negative  Urine Protein Read-only: Negative       Assessment and Plan    Problem List Items  Addressed This Visit          Gravid and     Prenatal care - Primary    Relevant Orders    POC Urinalysis Dipstick (Completed)       Pregnancy at 24w3d  Fetal status reassuring.  anatomy scan incomplete.  1 hour gtt, CBC, Antibody screen, TDAP, and RPR next visit. Instructions given  Discussed/encouraged TDAP vaccination after 28 weeks  Activity and Exercise discussed.  Return in about 4 weeks (around 2024) for anatomy usg, glucola.      Isis Rodas MD  2024

## 2024-07-25 ENCOUNTER — TELEPHONE (OUTPATIENT)
Dept: OBSTETRICS AND GYNECOLOGY | Facility: CLINIC | Age: 19
End: 2024-07-25

## 2024-07-25 NOTE — TELEPHONE ENCOUNTER
PATIENT STATES SHE IS RETURNING CHRISTINA'S CALL. WOULD LIKE A CALL BACK, WILL BE UNAVAILABLE BETWEEN 2-3PM.    CALL BACK [phone]

## 2024-07-29 ENCOUNTER — TELEPHONE (OUTPATIENT)
Dept: OBSTETRICS AND GYNECOLOGY | Facility: CLINIC | Age: 19
End: 2024-07-29
Payer: COMMERCIAL

## 2024-07-29 NOTE — TELEPHONE ENCOUNTER
The [facility] received a fax that requires your attention. The document has been indexed to the patient’s chart for your review.      Reason for sending:  VD WRITTEN ORDER FOR BREAST PUMP AND OR REPLACEMENT PARTS.     Documents Description:  EXT MED REC_ [email or web address]_ 07-23-24    Name of Sender:  [employer]     Date Indexed: 07/29/24    Notes (if needed):

## 2024-07-29 NOTE — TELEPHONE ENCOUNTER
Provider: DR. NATAN NUNEZ    The [facility] received a fax that requires your attention. The document has been indexed to the patient's chart for your review.     Reason for sending: REVIEW / SIGNATURE     Documents Description: WRITTEN ORDER FOR SACROLLIAC SUPPORT     Name of Sender: [facility] [facility] [employer]     Date Indexed: 07/29/24    Notes (if needed): INDEXED INTO CHART AS EXT MED RECS 07/23/24

## 2024-08-01 ENCOUNTER — TELEPHONE (OUTPATIENT)
Dept: OBSTETRICS AND GYNECOLOGY | Facility: CLINIC | Age: 19
End: 2024-08-01
Payer: COMMERCIAL

## 2024-08-01 NOTE — TELEPHONE ENCOUNTER
Provider: DR. NATAN NUNEZ    The [facility] received a fax that requires your attention. The document has been indexed to the patient's chart for your review.     Reason for sending: REVIEW / AUTHORIZATION     Documents Description: WRITTEN ORDER FOR BREAST PUMP     Name of Sender: [employer]     Date Indexed: 08/01/24    Notes (if needed): INDEXED INTO CHART AS EXT MED RECS 07/27/24.

## 2024-08-07 ENCOUNTER — ROUTINE PRENATAL (OUTPATIENT)
Dept: OBSTETRICS AND GYNECOLOGY | Facility: CLINIC | Age: 19
End: 2024-08-07
Payer: COMMERCIAL

## 2024-08-07 VITALS — BODY MASS INDEX: 26.42 KG/M2 | SYSTOLIC BLOOD PRESSURE: 120 MMHG | WEIGHT: 154 LBS | DIASTOLIC BLOOD PRESSURE: 70 MMHG

## 2024-08-07 DIAGNOSIS — Z34.90 PRENATAL CARE, ANTEPARTUM: Primary | ICD-10-CM

## 2024-08-07 LAB
ERYTHROCYTE [DISTWIDTH] IN BLOOD BY AUTOMATED COUNT: 12.4 % (ref 12.3–15.4)
GLUCOSE 1H P 50 G GLC PO SERPL-MCNC: 78 MG/DL (ref 65–139)
GLUCOSE UR STRIP-MCNC: NEGATIVE MG/DL
HCT VFR BLD AUTO: 29.8 % (ref 34–46.6)
HGB BLD-MCNC: 10.3 G/DL (ref 12–15.9)
MCH RBC QN AUTO: 31.7 PG (ref 26.6–33)
MCHC RBC AUTO-ENTMCNC: 34.6 G/DL (ref 31.5–35.7)
MCV RBC AUTO: 91.7 FL (ref 79–97)
PLATELET # BLD AUTO: 201 10*3/MM3 (ref 140–450)
PROT UR STRIP-MCNC: NEGATIVE MG/DL
RBC # BLD AUTO: 3.25 10*6/MM3 (ref 3.77–5.28)
WBC # BLD AUTO: 8.08 10*3/MM3 (ref 3.4–10.8)

## 2024-08-07 NOTE — PROGRESS NOTES
OB FOLLOW UP  CC- Here for care of pregnancy        Carol Ann Dominique is a 18 y.o.  28w2d patient being seen today for her obstetrical follow up. Patient reports feeling shaky throughout the day, mainly of the evening. This is accompanied by HA's and feet swelling. It has been every day this week.    Patient undergoing Glucola testing today. She is due for her testing at 0950.       MBT: O+  Rhogam: is not indicated.  28 week packet: reviewed with patient , counseled on fetal movement , pediatrician list reviewed, and childbirth classes reviewed  TDAP: declines  US done today: Yes.  Findings showed Male fetus in cephalic presentation fetal heart rate of 138.  Anterior placenta and three-vessel cord noted.  Normal fluid volume.  Estimated fetal weight 2 pounds 11 ounces which is 52nd percentile.  Fetus in back position.  That is making it difficult for heart views.  Still unable to adequately visualize right ventricular outflow track left ventricular outflow tract or three-vessel tracheal view..  I have personally evaluated the U/S and agree with the findings. Isis Rodas MD      Her prenatal care is complicated by (and status) : see below.  Patient Active Problem List   Diagnosis    GERD without esophagitis    Allergic conjunctivitis of both eyes    Eosinophilic esophagitis    Food allergy    Enlarged lymph nodes    Prenatal care    Pregnancy         ROS -   Patient Denies: Loss of Fluid, Vaginal Spotting, Vision Changes, and Contractions  Fetal Movement : normal    The additional following portions of the patient's history were reviewed and updated as appropriate: allergies, current medications, past family history, past medical history, past social history, past surgical history, and problem list.    I have reviewed and agree with the HPI, ROS, and historical information as entered above. Isis Rodas MD      /70   Wt 69.9 kg (154 lb)   LMP 2024   BMI 26.42 kg/m²          EXAM:     Prenatal Vitals  BP: 120/70  Weight: 69.9 kg (154 lb)   Fetal Heart Rate: 138bpm               Urine Glucose Read-only: Negative  Urine Protein Read-only: Negative         Assessment and Plan    Problem List Items Addressed This Visit          Gravid and     Prenatal care - Primary    Relevant Orders    CBC (No Diff)    Gestational Screen 1 Hr ([facility])    Antibody Screen    RPR Qualitative with Reflex to Quant    POC Urinalysis Dipstick (Completed)    [facility] [place]       Pregnancy at 28w2d  1 hr Glucola, CBC, RPR. Antibody screen and TDAP declines  Fetal movement/PTL or Labor precautions  Reviewed inability to complete anatomy ultrasound.  Discussed with Dr. Milligan.  Plan for evaluation by PDC.  Activity and Exercise discussed.  Return in about 4 weeks (around 2024).        Isis Rodas MD  2024

## 2024-08-08 PROBLEM — O99.019 MATERNAL ANEMIA IN PREGNANCY, ANTEPARTUM: Status: ACTIVE | Noted: 2024-08-08

## 2024-08-08 LAB
BLD GP AB SCN SERPL QL: NEGATIVE
RPR SER QL: NON REACTIVE

## 2024-08-22 ENCOUNTER — ROUTINE PRENATAL (OUTPATIENT)
Dept: OBSTETRICS AND GYNECOLOGY | Facility: CLINIC | Age: 19
End: 2024-08-22
Payer: COMMERCIAL

## 2024-08-22 ENCOUNTER — OFFICE VISIT (OUTPATIENT)
Dept: OBSTETRICS AND GYNECOLOGY | Facility: HOSPITAL | Age: 19
End: 2024-08-22
Payer: COMMERCIAL

## 2024-08-22 ENCOUNTER — HOSPITAL ENCOUNTER (OUTPATIENT)
Dept: WOMENS IMAGING | Facility: HOSPITAL | Age: 19
Discharge: HOME | End: 2024-08-22
Admitting: OBSTETRICS & GYNECOLOGY
Payer: COMMERCIAL

## 2024-08-22 VITALS — WEIGHT: 159.6 LBS | BODY MASS INDEX: 27.38 KG/M2 | SYSTOLIC BLOOD PRESSURE: 120 MMHG | DIASTOLIC BLOOD PRESSURE: 70 MMHG

## 2024-08-22 VITALS — SYSTOLIC BLOOD PRESSURE: 123 MMHG | WEIGHT: 159 LBS | DIASTOLIC BLOOD PRESSURE: 66 MMHG | BODY MASS INDEX: 27.28 KG/M2

## 2024-08-22 DIAGNOSIS — O26.843 UTERINE SIZE-DATE DISCREPANCY IN THIRD TRIMESTER: Primary | ICD-10-CM

## 2024-08-22 DIAGNOSIS — O26.843 UTERINE SIZE-DATE DISCREPANCY IN THIRD TRIMESTER: ICD-10-CM

## 2024-08-22 DIAGNOSIS — Z34.90 PRENATAL CARE, ANTEPARTUM: ICD-10-CM

## 2024-08-22 DIAGNOSIS — Z03.73 FETAL ANOMALY SUSPECTED BUT NOT FOUND: ICD-10-CM

## 2024-08-22 DIAGNOSIS — Z34.90 PREGNANCY, UNSPECIFIED GESTATIONAL AGE: ICD-10-CM

## 2024-08-22 DIAGNOSIS — O99.019 MATERNAL ANEMIA IN PREGNANCY, ANTEPARTUM: Primary | ICD-10-CM

## 2024-08-22 LAB
GLUCOSE UR STRIP-MCNC: NEGATIVE MG/DL
PROT UR STRIP-MCNC: NEGATIVE MG/DL

## 2024-08-22 PROCEDURE — 76819 FETAL BIOPHYS PROFIL W/O NST: CPT

## 2024-08-22 PROCEDURE — 76820 UMBILICAL ARTERY ECHO: CPT

## 2024-08-22 PROCEDURE — 76811 OB US DETAILED SNGL FETUS: CPT

## 2024-08-22 NOTE — LETTER
"2024     Isis Rodas MD  [address]  [facility] [address]    Patient: Carol Ann Dominique   YOB: 2005   Date of Visit: 2024     Dear Isis Rodas MD:       Thank you for referring Carol Ann Dominique to me for evaluation. Below are the relevant portions of my assessment and plan of care.    If you have questions, please do not hesitate to call me. I look forward to following Carol Ann along with you.         Sincerely,        Douglas A. Milligan, MD        CC: No Recipients    Milligan, Douglas A, MD  24 1032  Addendum      Maternal/Fetal Medicine Follow Up  Note     Name: Carol Ann Dominique    : 2005     MRN: [id number]     Referring Provider: Isis Rodas MD    Chief Complaint  incomplete anatomy    Subjective     History of Present Illness:  Carol Ann Dominique is a 18 y.o.  30w3d who presents today for incomplete fetal heart views    ESME: Estimated Date of Delivery: 10/28/24     ROS:   As noted in HPI.     Objective     Vital Signs  /66   Wt 72.1 kg (159 lb)   LMP 2024   Estimated body mass index is 27.28 kg/m² as calculated from the following:    Height as of 24: 162.6 cm (64.02\").    Weight as of this encounter: 72.1 kg (159 lb).    Physical Exam    Ultrasound Impression:    See Viewpoint    Assessment and Plan     Carol Ann Dominique is a 18 y.o.  30w3d who presents today for incomplete fetal heart views    Diagnoses and all orders for this visit:    1. Uterine size-date discrepancy in third trimester (Primary)  Assessment & Plan:  Patient referred to the  diagnostic center due to inability to obtain adequate fetal heart views and complete anatomic survey on this patient her primary OB office.  Patient reports no complications this pregnancy and notes good fetal movement.    Ultrasound today demonstrates a normally grown fetus albeit with abdominal circumference at approximately the 5th " percentile.  Fetal anatomy appears normal, especially fetal heart which was well-visualized and appeared structurally normal.  Amniotic fluid volume and umbilical artery Dopplers are normal.  Fetal testing is reassuring.    With only mild abdominal circumference lag and the remainder of the fetal exam being entirely normal this may or may not represent early intrauterine growth restriction.  We have recommended the patient be at increased rest off her feet and increase nutrition.  Patient reports that she does not smoke.  Out of an abundance of caution I would recommend twice weekly  testing at her local OB office.  We will rescan the patient again for growth and umbilical artery Dopplers in 2 weeks.    Patient was counseled extensively regarding fetal movement will contact her provider immediately if she notices any decreased fetal movement.    Orders:  -     [facility]; Future    2. Fetal anomaly suspected but not found  -     [facility]; Future    3. Pregnancy, unspecified gestational age  -     [facility]; Future         Follow Up  Return in about 2 weeks (around 2024).    I spent 25 minutes caring for the patient on the day of service. This included: obtaining or reviewing a separately obtained medical history, reviewing patient records, performing a medically appropriate exam and/or evaluation, counseling or educating the patient/family/caregiver, ordering medications, labs, and/or procedures and documenting such in the medical record. This does not include time spent on review and interpretation of other tests such as fetal ultrasound or the performance of other procedures such as amniocentesis or CVS.      Douglas A. Milligan, MD  Maternal Fetal Medicine, [facility]     4Documentation of the ultrasound findings, images, and interpretations will be available in the  patient's Viewpoint report which is located in the imaging tab in chart review.

## 2024-08-22 NOTE — PROGRESS NOTES
"    Maternal/Fetal Medicine Follow Up  Note     Name: Carol Ann Dominique    : 2005     MRN: [id number]     Referring Provider: Isis Rodas MD    Chief Complaint  incomplete anatomy    Subjective     History of Present Illness:  Carol Ann Dominique is a 18 y.o.  30w3d who presents today for incomplete fetal heart views    ESME: Estimated Date of Delivery: 10/28/24     ROS:   As noted in HPI.     Objective     Vital Signs  /66   Wt 72.1 kg (159 lb)   LMP 2024   Estimated body mass index is 27.28 kg/m² as calculated from the following:    Height as of 24: 162.6 cm (64.02\").    Weight as of this encounter: 72.1 kg (159 lb).    Physical Exam    Ultrasound Impression:    See Viewpoint    Assessment and Plan     Carol Ann Dominique is a 18 y.o.  30w3d who presents today for incomplete fetal heart views    Diagnoses and all orders for this visit:    1. Uterine size-date discrepancy in third trimester (Primary)  Assessment & Plan:  Patient referred to the  diagnostic center due to inability to obtain adequate fetal heart views and complete anatomic survey on this patient her primary OB office.  Patient reports no complications this pregnancy and notes good fetal movement.    Ultrasound today demonstrates a normally grown fetus albeit with abdominal circumference at approximately the 5th percentile.  Fetal anatomy appears normal, especially fetal heart which was well-visualized and appeared structurally normal.  Amniotic fluid volume and umbilical artery Dopplers are normal.  Fetal testing is reassuring.    With only mild abdominal circumference lag and the remainder of the fetal exam being entirely normal this may or may not represent early intrauterine growth restriction.  We have recommended the patient be at increased rest off her feet and increase nutrition.  Patient reports that she does not smoke.  Out of an abundance of caution I would recommend twice weekly "  testing at her local OB office.  We will rescan the patient again for growth and umbilical artery Dopplers in 2 weeks.    Patient was counseled extensively regarding fetal movement will contact her provider immediately if she notices any decreased fetal movement.    Orders:  -     [facility]; Future    2. Fetal anomaly suspected but not found  -     [facility]; Future    3. Pregnancy, unspecified gestational age  -     [facility]; Future         Follow Up  Return in about 2 weeks (around 2024).    I spent 25 minutes caring for the patient on the day of service. This included: obtaining or reviewing a separately obtained medical history, reviewing patient records, performing a medically appropriate exam and/or evaluation, counseling or educating the patient/family/caregiver, ordering medications, labs, and/or procedures and documenting such in the medical record. This does not include time spent on review and interpretation of other tests such as fetal ultrasound or the performance of other procedures such as amniocentesis or CVS.      Douglas A. Milligan, MD  Maternal Fetal Medicine, [facility] [place]     4Documentation of the ultrasound findings, images, and interpretations will be available in the patient's Viewpoint report which is located in the imaging tab in chart review.

## 2024-08-22 NOTE — ASSESSMENT & PLAN NOTE
Patient referred to the  diagnostic center due to inability to obtain adequate fetal heart views and complete anatomic survey on this patient her primary OB office.  Patient reports no complications this pregnancy and notes good fetal movement.    Ultrasound today demonstrates a normally grown fetus albeit with abdominal circumference at approximately the 5th percentile.  Fetal anatomy appears normal, especially fetal heart which was well-visualized and appeared structurally normal.  Amniotic fluid volume and umbilical artery Dopplers are normal.  Fetal testing is reassuring.    With only mild abdominal circumference lag and the remainder of the fetal exam being entirely normal this may or may not represent early intrauterine growth restriction.  We have recommended the patient be at increased rest off her feet and increase nutrition.  Patient reports that she does not smoke.  Out of an abundance of caution I would recommend twice weekly  testing at her local OB office.  We will rescan the patient again for growth and umbilical artery Dopplers in 2 weeks.    Patient was counseled extensively regarding fetal movement will contact her provider immediately if she notices any decreased fetal movement.

## 2024-08-22 NOTE — PROGRESS NOTES
Patient denies bleeding, leaking fluid or contractions  NIPT declined  AFP declined  Patients next follow up with Dr. Rodas's office is today

## 2024-08-22 NOTE — ADDENDUM NOTE
Addended by: MILLIGAN, DOUGLAS A on: 8/22/2024 10:32 AM     Modules accepted: Orders, Level of Service

## 2024-08-22 NOTE — PROGRESS NOTES
"      OB FOLLOW UP  CC- Here for care of pregnancy        Carol Ann Dominique is a 18 y.o.  30w3d patient being seen today for her obstetrical follow up visit. Patient reports occasional swelling in hands and abdominal cramping.     Her prenatal care is complicated by (and status) :  see below.  Patient Active Problem List   Diagnosis    GERD without esophagitis    Allergic conjunctivitis of both eyes    Eosinophilic esophagitis    Food allergy    Enlarged lymph nodes    Prenatal care    Pregnancy    Maternal anemia in pregnancy, antepartum    Uterine size-date discrepancy in third trimester         TDAP status: declines  Rhogam status: was not indicated  28 week labs: Reviewed and Labs show anemia. She is taking additional iron supplement.  Ultrasound Today: Yes at [facility].             Maternal/Fetal Medicine Follow Up  Note      Name: Carol Ann Dominique    : 2005     MRN: [id number]      Referring Provider: Isis Rodas MD     Chief Complaint  incomplete anatomy     Subjective      History of Present Illness:  Carol Ann Dominique is a 18 y.o.  30w3d who presents today for incomplete fetal heart views     ESME: Estimated Date of Delivery: 10/28/24      ROS:   As noted in HPI.      Objective      Vital Signs  /66   Wt 72.1 kg (159 lb)   LMP 2024   Estimated body mass index is 27.28 kg/m² as calculated from the following:    Height as of 24: 162.6 cm (64.02\").    Weight as of this encounter: 72.1 kg (159 lb).     Physical Exam     Ultrasound Impression:    See Viewpoint     Assessment and Plan      Carol Ann Dominique is a 18 y.o.  30w3d who presents today for incomplete fetal heart views     Diagnoses and all orders for this visit:     1. Uterine size-date discrepancy in third trimester (Primary)  Assessment & Plan:  Patient referred to the  diagnostic center due to inability to obtain adequate fetal heart views and complete anatomic survey on this patient her " primary OB office.  Patient reports no complications this pregnancy and notes good fetal movement.     Ultrasound today demonstrates a normally grown fetus albeit with abdominal circumference at approximately the 5th percentile.  Fetal anatomy appears normal, especially fetal heart which was well-visualized and appeared structurally normal.  Amniotic fluid volume and umbilical artery Dopplers are normal.  Fetal testing is reassuring.     With only mild abdominal circumference lag and the remainder of the fetal exam being entirely normal this may or may not represent early intrauterine growth restriction.  We have recommended the patient be at increased rest off her feet and increase nutrition.  Patient reports that she does not smoke.  Out of an abundance of caution I would recommend twice weekly  testing at her local OB office.  We will rescan the patient again for growth and umbilical artery Dopplers in 2 weeks.     Patient was counseled extensively regarding fetal movement will contact her provider immediately if she notices any decreased fetal movement.     Orders:  -     [facility]; Future     2. Fetal anomaly suspected but not found  -     [facility]; Future     3. Pregnancy, unspecified gestational age  -     [facility]; Future           Follow Up  Return in about 2 weeks (around 2024).          Non Stress Test: No.      ROS -   Patient Denies: Loss of Fluid, Vaginal Spotting, Vision Changes, Headaches, Nausea , Vomiting , Epigastric pain, and skin itching  Fetal Movement : normal  All other systems reviewed and are negative.       The additional following portions of the patient's history were reviewed and updated as appropriate: allergies and current medications.    I have reviewed and agree with the HPI, ROS, and historical information as entered above. Isis Rodas MD      /70   Wt 72.4 kg (159 lb 9.6 oz)    LMP 2024   BMI 27.38 kg/m²         EXAM:     Prenatal Vitals  BP: 120/70  Weight: 72.4 kg (159 lb 9.6 oz)   Fetal Heart Rate: PDC               Urine Glucose Read-only: Negative  Urine Protein Read-only: Negative           Assessment and Plan    Problem List Items Addressed This Visit          Gravid and     Prenatal care    Relevant Orders    POC Urinalysis Dipstick (Completed)    Uterine size-date discrepancy in third trimester    Overview     2024-at 30 weeks and 3 days AC noted to be 5th percentile at PDC.  Will perform twice weekly NSTs and reassess growth with them on 2024.            Hematology and Neoplasia    Maternal anemia in pregnancy, antepartum - Primary    Overview     Hemoglobin 10.3 at 28 weeks.  Advised iron therapy.         Relevant Medications    Ferrous Sulfate (IRON PO)       Pregnancy at 30w3d  Fetal status reassuring.  28 week labs reviewed.    Activity and Exercise discussed.  Fetal movement/PTL or Labor precautions  Return in about 4 days (around 2024) for nst.    Isis Rodas MD  2024

## 2024-08-26 ENCOUNTER — ROUTINE PRENATAL (OUTPATIENT)
Dept: OBSTETRICS AND GYNECOLOGY | Facility: CLINIC | Age: 19
End: 2024-08-26
Payer: COMMERCIAL

## 2024-08-26 VITALS — DIASTOLIC BLOOD PRESSURE: 84 MMHG | WEIGHT: 160.4 LBS | SYSTOLIC BLOOD PRESSURE: 122 MMHG | BODY MASS INDEX: 27.52 KG/M2

## 2024-08-26 DIAGNOSIS — Z3A.31 31 WEEKS GESTATION OF PREGNANCY: Primary | ICD-10-CM

## 2024-08-26 DIAGNOSIS — O99.019 MATERNAL ANEMIA IN PREGNANCY, ANTEPARTUM: ICD-10-CM

## 2024-08-26 DIAGNOSIS — O26.843 UTERINE SIZE-DATE DISCREPANCY IN THIRD TRIMESTER: ICD-10-CM

## 2024-08-26 LAB
GLUCOSE UR STRIP-MCNC: NEGATIVE MG/DL
PROT UR STRIP-MCNC: NEGATIVE MG/DL

## 2024-08-26 PROCEDURE — 59025 FETAL NON-STRESS TEST: CPT

## 2024-08-26 PROCEDURE — 99213 OFFICE O/P EST LOW 20 MIN: CPT

## 2024-08-26 NOTE — PROGRESS NOTES
OB FOLLOW UP  CC- Here for care of pregnancy        Carol Ann Dominique is a 18 y.o.  31w0d patient being seen today for her obstetrical follow up visit. Patient reports blurry vision at times that is mild but has resolved.     Her prenatal care is complicated by (and status) :  see below.  Patient Active Problem List   Diagnosis    GERD without esophagitis    Allergic conjunctivitis of both eyes    Eosinophilic esophagitis    Food allergy    Enlarged lymph nodes    Prenatal care    Pregnancy    Maternal anemia in pregnancy, antepartum    Uterine size-date discrepancy in third trimester       TDAP status: declines  Rhogam status: was not indicated  28 week labs: Reviewed, normal, and Labs show anemia. She is taking additional iron supplement.  Ultrasound Today: No  Non Stress Test: Yes minutes >20  non-stress test: NST: Reactive  indication: Fetal Growth Restriction (IUGR)      ROS -   Patient Denies: Loss of Fluid, Vaginal Spotting, Vision Changes, Headaches, Nausea , Vomiting , Contractions, Epigastric pain, and skin itching  Fetal Movement : normal  All other systems reviewed and are negative.       The additional following portions of the patient's history were reviewed and updated as appropriate: allergies, current medications, past family history, past medical history, past social history, past surgical history, and problem list.    I have reviewed and agree with the HPI, ROS, and historical information as entered above. Mariela Vo, APRN      /84   Wt 72.8 kg (160 lb 6.4 oz)   LMP 2024   BMI 27.52 kg/m²         EXAM:     Prenatal Vitals  BP: 122/84  Weight: 72.8 kg (160 lb 6.4 oz)   Fetal Heart Rate: NST           Urine Glucose Read-only: Negative  Urine Protein Read-only: Negative       Assessment and Plan    Problem List Items Addressed This Visit       Maternal anemia in pregnancy, antepartum    Overview     Hemoglobin 10.3 at 28 weeks.  Advised iron therapy.         Relevant  Medications    Ferrous Sulfate (IRON PO)    Pregnancy - Primary    Relevant Orders    POC Urinalysis Dipstick (Completed)    Uterine size-date discrepancy in third trimester    Overview     8/22/2024-at 30 weeks and 3 days AC noted to be 5th percentile at PDC.  Will perform twice weekly NSTs and reassess growth with them on 9/5/2024.            Pregnancy at 31w0d  Fetal status reassuring.  28 week labs reviewed.    Activity and Exercise discussed.  Fetal movement/PTL or Labor precautions  Patient is on Prenatal vitamins  Discussed/encouraged social distancing/COVID19 precautions; encouraged vaccination when able  Reviewed Pre-eclampsia signs/symptoms  Return in about 3 days (around 8/29/2024) for CHRIS nix/ TIMMY Rodas.    Mariela Vo, APRN  08/26/2024

## 2024-08-29 ENCOUNTER — ROUTINE PRENATAL (OUTPATIENT)
Dept: OBSTETRICS AND GYNECOLOGY | Facility: CLINIC | Age: 19
End: 2024-08-29
Payer: COMMERCIAL

## 2024-08-29 VITALS — DIASTOLIC BLOOD PRESSURE: 64 MMHG | BODY MASS INDEX: 27.62 KG/M2 | SYSTOLIC BLOOD PRESSURE: 116 MMHG | WEIGHT: 161 LBS

## 2024-08-29 DIAGNOSIS — Z3A.31 31 WEEKS GESTATION OF PREGNANCY: ICD-10-CM

## 2024-08-29 DIAGNOSIS — O26.899 PELVIC CRAMPING IN ANTEPARTUM PERIOD: ICD-10-CM

## 2024-08-29 DIAGNOSIS — R10.9 CRAMPING AFFECTING PREGNANCY, ANTEPARTUM: ICD-10-CM

## 2024-08-29 DIAGNOSIS — Z34.93 PRENATAL CARE, THIRD TRIMESTER: Primary | ICD-10-CM

## 2024-08-29 DIAGNOSIS — O26.843 UTERINE SIZE-DATE DISCREPANCY IN THIRD TRIMESTER: ICD-10-CM

## 2024-08-29 DIAGNOSIS — R10.2 PELVIC CRAMPING IN ANTEPARTUM PERIOD: ICD-10-CM

## 2024-08-29 DIAGNOSIS — O36.5930: ICD-10-CM

## 2024-08-29 DIAGNOSIS — O26.899 CRAMPING AFFECTING PREGNANCY, ANTEPARTUM: ICD-10-CM

## 2024-08-29 DIAGNOSIS — O99.019 MATERNAL ANEMIA IN PREGNANCY, ANTEPARTUM: ICD-10-CM

## 2024-08-29 LAB
BILIRUB BLD-MCNC: NEGATIVE MG/DL
CLARITY, POC: CLEAR
COLOR UR: YELLOW
GLUCOSE UR STRIP-MCNC: NEGATIVE MG/DL
KETONES UR QL: NEGATIVE
LEUKOCYTE EST, POC: NEGATIVE
NITRITE UR-MCNC: NEGATIVE MG/ML
PH UR: 6 [PH] (ref 5–8)
PROT UR STRIP-MCNC: NEGATIVE MG/DL
RBC # UR STRIP: NEGATIVE /UL
SP GR UR: 1.01 (ref 1–1.03)
UROBILINOGEN UR QL: NORMAL

## 2024-08-29 NOTE — PROGRESS NOTES
OB FOLLOW UP  CC- Here for care of pregnancy        Carol Ann Dominique is a 18 y.o.  31w3d patient being seen today for her obstetrical follow up visit. Patient reports daily heartburn (Tums helps), absent [place] since last visit, and occasional trace BLE/BUE edema. She also reports cramping at the lower pelvic region and lower back that has been present 1 month and worsening lately.     Her prenatal care is complicated by (and status):  see below.  Patient Active Problem List   Diagnosis    GERD without esophagitis    Allergic conjunctivitis of both eyes    Eosinophilic esophagitis    Food allergy    Enlarged lymph nodes    Prenatal care    Pregnancy    Maternal anemia in pregnancy, antepartum    Uterine size-date discrepancy in third trimester       Flu Status:  not currently flu season  TDAP status:  declined last visit  Rhogam status: was not indicated  28 week labs: Reviewed and Labs show anemia. She is taking additional iron supplement.  Ultrasound Today: No  Non Stress Test: Yes, 20+ minutes  non-stress test: NST: Reactive  indication:  AC lag  category: Category I        ROS -   Patient Denies: Loss of Fluid, Vaginal Spotting, Vision Changes, Headaches, Nausea , Vomiting , Contractions, and skin itching  Fetal Movement: normal  All other systems reviewed and are negative.       The additional following portions of the patient's history were reviewed and updated as appropriate: allergies, current medications, past family history, past medical history, past social history, past surgical history, and problem list.    I have reviewed and agree with the HPI, ROS, and historical information as entered above. Shelby Selby, APRN\    /64   Wt 73 kg (161 lb)   LMP 2024   BMI 27.62 kg/m²         EXAM:     Prenatal Vitals  BP: 116/64  Weight: 73 kg (161 lb)   Fetal Heart Rate: NST+               Urine Glucose Read-only: Negative  Urine Protein Read-only: Negative            Assessment and Plan    Problem List Items Addressed This Visit       Pregnancy    Relevant Orders    POC Urinalysis Dipstick (Completed)    Fetal Nonstress Test    Maternal anemia in pregnancy, antepartum    Overview     Hemoglobin 10.3 at 28 weeks.  Advised iron therapy.         Relevant Medications    Ferrous Sulfate (IRON PO)    Uterine size-date discrepancy in third trimester    Overview     8/22/2024-at 30 weeks and 3 days AC noted to be 5th percentile at [facility].  Will perform twice weekly NSTs and reassess growth with them on 9/5/2024.          Other Visit Diagnoses       Prenatal care, third trimester    -  Primary    Relevant Orders    POC Urinalysis Dipstick (Completed)    Fetal Nonstress Test    Pelvic cramping in antepartum period        Relevant Orders    POC Urinalysis Dipstick (Completed)    Poor clinical fetal growth in third trimester        Relevant Orders    Fetal Nonstress Test    Cramping affecting pregnancy, antepartum        Relevant Orders    Urine Culture - Urine, Urine, Clean Catch            Pregnancy at 31w3d  Pt reports low abdominal cramping over the last month. CCUA negative but will send for culture.   Fetal status reassuring. NST reactive > 20 minutes. No contractions, no decels.   28 week labs reviewed.    Activity and Exercise discussed.  Fetal movement/PTL or Labor precautions  Patient is on Prenatal vitamins  Reviewed Pre-eclampsia signs/symptoms  Return in about 5 days (around 9/3/2024) for NST.    Shelby Selby, APRN  08/29/2024

## 2024-08-31 LAB
BACTERIA UR CULT: NORMAL
BACTERIA UR CULT: NORMAL

## 2024-09-03 ENCOUNTER — ROUTINE PRENATAL (OUTPATIENT)
Dept: OBSTETRICS AND GYNECOLOGY | Facility: CLINIC | Age: 19
End: 2024-09-03
Payer: COMMERCIAL

## 2024-09-03 VITALS — SYSTOLIC BLOOD PRESSURE: 108 MMHG | BODY MASS INDEX: 27.76 KG/M2 | WEIGHT: 161.8 LBS | DIASTOLIC BLOOD PRESSURE: 72 MMHG

## 2024-09-03 DIAGNOSIS — O26.843 UTERINE SIZE-DATE DISCREPANCY IN THIRD TRIMESTER: ICD-10-CM

## 2024-09-03 DIAGNOSIS — Z34.93 PRENATAL CARE IN THIRD TRIMESTER: Primary | ICD-10-CM

## 2024-09-03 DIAGNOSIS — O99.019 MATERNAL ANEMIA IN PREGNANCY, ANTEPARTUM: ICD-10-CM

## 2024-09-03 LAB
GLUCOSE UR STRIP-MCNC: NEGATIVE MG/DL
PROT UR STRIP-MCNC: NEGATIVE MG/DL

## 2024-09-03 PROCEDURE — 99213 OFFICE O/P EST LOW 20 MIN: CPT | Performed by: OBSTETRICS & GYNECOLOGY

## 2024-09-03 PROCEDURE — 59025 FETAL NON-STRESS TEST: CPT | Performed by: OBSTETRICS & GYNECOLOGY

## 2024-09-03 NOTE — PROGRESS NOTES
OB FOLLOW UP  CC- Here for care of pregnancy        Carol Ann Dominique is a 18 y.o.  32w1d patient being seen today for her obstetrical follow up visit. Patient reports heartburn, she is taking TUMS.     Her prenatal care is complicated by (and status) :  see below.  Patient Active Problem List   Diagnosis    GERD without esophagitis    Allergic conjunctivitis of both eyes    Eosinophilic esophagitis    Food allergy    Enlarged lymph nodes    Prenatal care    Pregnancy    Maternal anemia in pregnancy, antepartum    Uterine size-date discrepancy in third trimester       Flu Status:  not currently flu season  TDAP status: declines  Rhogam status: was not indicated  28 week labs: Reviewed and Labs show anemia. She is taking additional iron supplement.  Ultrasound Today: No  Non Stress Test: Yes minutes 20  non-stress test: NST: Reactive  indication:  AC lag  category: Category I        ROS -   Patient Denies: Loss of Fluid, Vaginal Spotting, Vision Changes, Headaches, Nausea , Vomiting , Contractions, Epigastric pain, and skin itching  Fetal Movement : normal  All other systems reviewed and are negative.       The additional following portions of the patient's history were reviewed and updated as appropriate: allergies and current medications.    I have reviewed and agree with the HPI, ROS, and historical information as entered above. Isis Rodas MD      /72   Wt 73.4 kg (161 lb 12.8 oz)   LMP 2024   BMI 27.76 kg/m²         EXAM:     Prenatal Vitals  BP: 108/72  Weight: 73.4 kg (161 lb 12.8 oz)   Fetal Heart Rate: NST               Urine Glucose Read-only: Negative  Urine Protein Read-only: Negative           Assessment and Plan    Problem List Items Addressed This Visit          Gravid and     Prenatal care - Primary    Relevant Orders    POC Urinalysis Dipstick (Completed)    Uterine size-date discrepancy in third trimester    Overview     2024-at 30 weeks and 3 days  AC noted to be 5th percentile at [facility].  Will perform twice weekly NSTs and reassess growth with them on 9/5/2024.            Hematology and Neoplasia    Maternal anemia in pregnancy, antepartum    Overview     Hemoglobin 10.3 at 28 weeks.  Advised iron therapy.         Relevant Medications    Ferrous Sulfate (IRON PO)       Pregnancy at 32w1d  Fetal status reassuring.  28 week labs reviewed.    Activity and Exercise discussed.  Fetal movement/PTL or Labor precautions  Return in about 2 days (around 9/5/2024) for PDC same day.    Isis Rodas MD  09/03/2024

## 2024-09-05 ENCOUNTER — ROUTINE PRENATAL (OUTPATIENT)
Dept: OBSTETRICS AND GYNECOLOGY | Facility: CLINIC | Age: 19
End: 2024-09-05
Payer: COMMERCIAL

## 2024-09-05 ENCOUNTER — HOSPITAL ENCOUNTER (OUTPATIENT)
Dept: WOMENS IMAGING | Facility: HOSPITAL | Age: 19
Discharge: HOME | End: 2024-09-05
Admitting: OBSTETRICS & GYNECOLOGY
Payer: COMMERCIAL

## 2024-09-05 ENCOUNTER — OFFICE VISIT (OUTPATIENT)
Dept: OBSTETRICS AND GYNECOLOGY | Facility: HOSPITAL | Age: 19
End: 2024-09-05
Payer: COMMERCIAL

## 2024-09-05 VITALS — DIASTOLIC BLOOD PRESSURE: 80 MMHG | BODY MASS INDEX: 27.79 KG/M2 | WEIGHT: 162 LBS | SYSTOLIC BLOOD PRESSURE: 118 MMHG

## 2024-09-05 DIAGNOSIS — Z34.93 PRENATAL CARE IN THIRD TRIMESTER: ICD-10-CM

## 2024-09-05 DIAGNOSIS — Z03.73 FETAL ANOMALY SUSPECTED BUT NOT FOUND: ICD-10-CM

## 2024-09-05 DIAGNOSIS — Z34.90 PREGNANCY, UNSPECIFIED GESTATIONAL AGE: ICD-10-CM

## 2024-09-05 DIAGNOSIS — O99.019 MATERNAL ANEMIA IN PREGNANCY, ANTEPARTUM: ICD-10-CM

## 2024-09-05 DIAGNOSIS — O26.843 UTERINE SIZE-DATE DISCREPANCY IN THIRD TRIMESTER: Primary | ICD-10-CM

## 2024-09-05 DIAGNOSIS — O26.843 UTERINE SIZE-DATE DISCREPANCY IN THIRD TRIMESTER: ICD-10-CM

## 2024-09-05 PROBLEM — R59.9 ENLARGED LYMPH NODES: Status: RESOLVED | Noted: 2023-12-07 | Resolved: 2024-09-05

## 2024-09-05 LAB
GLUCOSE UR STRIP-MCNC: NEGATIVE MG/DL
PROT UR STRIP-MCNC: NEGATIVE MG/DL

## 2024-09-05 PROCEDURE — 76819 FETAL BIOPHYS PROFIL W/O NST: CPT

## 2024-09-05 PROCEDURE — 76820 UMBILICAL ARTERY ECHO: CPT

## 2024-09-05 PROCEDURE — 76816 OB US FOLLOW-UP PER FETUS: CPT

## 2024-09-05 NOTE — PROGRESS NOTES
"Documentation of the ultrasound findings, images, and interpretations will be available in the patient's Viewpoint report which is located in the imaging tab in chart review.    Maternal/Fetal Medicine Follow Up  Note     Name: Carol Ann Dominique    : 2005     MRN: [id number]     Referring Provider: Isis Rodas MD    Chief Complaint  No chief complaint on file.    Subjective     History of Present Illness:  Carol Ann Dominique is a 18 y.o.  32w3d who presents today for S<D    ESME: Estimated Date of Delivery: 10/28/24     ROS:   As noted in HPI.     Objective     Vital Signs  LMP 2024   Estimated body mass index is 27.76 kg/m² as calculated from the following:    Height as of 24: 162.6 cm (64.02\").    Weight as of 9/3/24: 73.4 kg (161 lb 12.8 oz).    Physical Exam    Ultrasound Impression:   See Viewpoint    Assessment and Plan     Carol Ann Dominique is a 18 y.o.  32w3d who presents today for S<D    Diagnoses and all orders for this visit:    1. Uterine size-date discrepancy in third trimester (Primary)  Assessment & Plan:  Patient returns today for pregnancy complicated by abdominal circumference lag.  Ultrasound performed in our office on  demonstrated overall growth at the 39th percentile but abdominal circumference at the 4th percentile.  Patient has been in increased rest and nutrition since her last visit.  She reports no complications and notes good fetal movement.    Ultrasound today demonstrates a normally grown fetus with the overall growth at approximately 47th percentile for dates and abdominal circumference improved to the 14th to the 15th percentile.  Amniotic fluid volume and umbilical artery Dopplers are normal.  BPP is 8 out of 8.    Abdominal circumference lag appears to have improved and is normal today.  Overall growth is excellent.  We have recommended the patient continue with increased rest and nutrition but may liberalize her activities.  We will " rescan the patient again in 3 weeks time to assess fetal growth to ensure growth remains good.    Patient was counseled extensively regarding fetal movement and will contact her provider immediately if she notices any decreased fetal movement.    Orders:  -     [facility]  Diagnostic Center; Future    2. Pregnancy, unspecified gestational age  -     [facility]  Diagnostic Center; Future         Follow Up  Return in about 3 weeks (around 2024).    I spent 10 minutes caring for the patient on the day of service. This included: obtaining or reviewing a separately obtained medical history, reviewing patient records, performing a medically appropriate exam and/or evaluation, counseling or educating the patient/family/caregiver, ordering medications, labs, and/or procedures and documenting such in the medical record. This does not include time spent on review and interpretation of other tests such as fetal ultrasound or the performance of other procedures such as amniocentesis or CVS.      Douglas A. Milligan, MD  Maternal Fetal Medicine, [facility] [place]     2024

## 2024-09-05 NOTE — LETTER
"2024     Isis Rodas MD  [address] [facility] [address]  [facility] [id number]    Patient: Carol Ann Dominique   YOB: 2005   Date of Visit: 2024     Dear Isis Rodas MD:       Thank you for referring Carol Ann Dominique to me for evaluation. Below are the relevant portions of my assessment and plan of care.    If you have questions, please do not hesitate to call me. I look forward to following Carol Ann along with you.         Sincerely,        Douglas A. Milligan, MD        CC: No Recipients    Milligan, Douglas A, MD  24 0910  Sign when Signing Visit  Documentation of the ultrasound findings, images, and interpretations will be available in the patient's Viewpoint report which is located in the imaging tab in chart review.    Maternal/Fetal Medicine Follow Up  Note     Name: Carol Ann Dominique    : 2005     MRN: [id number]     Referring Provider: Isis Rodas MD    Chief Complaint  No chief complaint on file.    Subjective     History of Present Illness:  Carol Ann Dominique is a 18 y.o.  32w3d who presents today for S<D    ESME: Estimated Date of Delivery: 10/28/24     ROS:   As noted in HPI.     Objective     Vital Signs  LMP 2024   Estimated body mass index is 27.76 kg/m² as calculated from the following:    Height as of 24: 162.6 cm (64.02\").    Weight as of 9/3/24: 73.4 kg (161 lb 12.8 oz).    Physical Exam    Ultrasound Impression:   See Viewpoint    Assessment and Plan     Carol Ann Dominique is a 18 y.o.  32w3d who presents today for S<D    Diagnoses and all orders for this visit:    1. Uterine size-date discrepancy in third trimester (Primary)  Assessment & Plan:  Patient returns today for pregnancy complicated by abdominal circumference lag.  Ultrasound performed in our office on  demonstrated overall growth at the 39th percentile but abdominal circumference at the 4th percentile.  Patient has been in increased " rest and nutrition since her last visit.  She reports no complications and notes good fetal movement.    Ultrasound today demonstrates a normally grown fetus with the overall growth at approximately 47th percentile for dates and abdominal circumference improved to the 14th to the 15th percentile.  Amniotic fluid volume and umbilical artery Dopplers are normal.  BPP is 8 out of 8.    Abdominal circumference lag appears to have improved and is normal today.  Overall growth is excellent.  We have recommended the patient continue with increased rest and nutrition but may liberalize her activities.  We will rescan the patient again in 3 weeks time to assess fetal growth to ensure growth remains good.    Patient was counseled extensively regarding fetal movement and will contact her provider immediately if she notices any decreased fetal movement.    Orders:  -     Pending sale to [facility]; Future    2. Pregnancy, unspecified gestational age  -     Pending sale to [facility]; Future         Follow Up  Return in about 3 weeks (around 2024).    I spent 10 minutes caring for the patient on the day of service. This included: obtaining or reviewing a separately obtained medical history, reviewing patient records, performing a medically appropriate exam and/or evaluation, counseling or educating the patient/family/caregiver, ordering medications, labs, and/or procedures and documenting such in the medical record. This does not include time spent on review and interpretation of other tests such as fetal ultrasound or the performance of other procedures such as amniocentesis or CVS.      Douglas A. Milligan, MD  Maternal Fetal Medicine, [facility]     2024

## 2024-09-05 NOTE — ASSESSMENT & PLAN NOTE
Patient returns today for pregnancy complicated by abdominal circumference lag.  Ultrasound performed in our office on 22 August demonstrated overall growth at the 39th percentile but abdominal circumference at the 4th percentile.  Patient has been in increased rest and nutrition since her last visit.  She reports no complications and notes good fetal movement.    Ultrasound today demonstrates a normally grown fetus with the overall growth at approximately 47th percentile for dates and abdominal circumference improved to the 14th to the 15th percentile.  Amniotic fluid volume and umbilical artery Dopplers are normal.  BPP is 8 out of 8.    Abdominal circumference lag appears to have improved and is normal today.  Overall growth is excellent.  We have recommended the patient continue with increased rest and nutrition but may liberalize her activities.  We will rescan the patient again in 3 weeks time to assess fetal growth to ensure growth remains good.    Patient was counseled extensively regarding fetal movement and will contact her provider immediately if she notices any decreased fetal movement.

## 2024-09-05 NOTE — PROGRESS NOTES
OB FOLLOW UP  CC- Here for care of pregnancy        Carol Ann Dominique is a 18 y.o.  32w3d patient being seen today for her obstetrical follow up visit. Patient reports no complaints.     Her prenatal care is complicated by (and status) :  see below.  Patient Active Problem List   Diagnosis    GERD without esophagitis    Allergic conjunctivitis of both eyes    Eosinophilic esophagitis    Food allergy    Prenatal care    Maternal anemia in pregnancy, antepartum    Uterine size-date discrepancy in third trimester         TDAP status: declines  Rhogam status: was not indicated  28 week labs: Reviewed and Labs show anemia. She is taking additional iron supplement.  US done today: Yes @ [facility].  Non Stress Test: No.      ROS -   Patient Denies: Loss of Fluid, Vaginal Spotting, Vision Changes, Headaches, Nausea , Vomiting , Contractions, Epigastric pain, and skin itching  Fetal Movement : normal  All other systems reviewed and are negative.       The additional following portions of the patient's history were reviewed and updated as appropriate: allergies, current medications, past family history, past medical history, past social history, past surgical history, and problem list.    I have reviewed and agree with the HPI, ROS, and historical information as entered above. Isis Rodas MD      /80   Wt 73.5 kg (162 lb)   LMP 2024   BMI 27.79 kg/m²         EXAM:     Prenatal Vitals  BP: 118/80  Weight: 73.5 kg (162 lb)   Fetal Heart Rate: usg               Urine Glucose Read-only: Negative  Urine Protein Read-only: Negative           Assessment and Plan    Problem List Items Addressed This Visit          Gravid and     Prenatal care    Relevant Orders    POC Urinalysis Dipstick (Completed)    Uterine size-date discrepancy in third trimester - Primary    Overview     2024-at 30 weeks and 3 days AC noted to be 5th percentile at PD[facility].  Will perform twice weekly NSTs and reassess  growth with them on 9/5/2024.  9/5/2024-AC is at the 14th percentile with overall growth at 47th percentile.  This is considered normal growth.  They will reassess once more on 9/26/2024            Hematology and Neoplasia    Maternal anemia in pregnancy, antepartum    Overview     Hemoglobin 10.3 at 28 weeks.  Advised iron therapy.         Relevant Medications    Ferrous Sulfate (IRON PO)       Pregnancy at 32w3d  Fetal status reassuring.  Information sheet on RSV vaccine provided to patient.  She declines at this time.  She will consider in the future.  28 week labs reviewed.    Activity and Exercise discussed.  Fetal movement/PTL or Labor precautions  Return in about 2 weeks (around 9/19/2024).    Isis Rodas MD  09/05/2024

## 2024-09-19 ENCOUNTER — ROUTINE PRENATAL (OUTPATIENT)
Dept: OBSTETRICS AND GYNECOLOGY | Facility: CLINIC | Age: 19
End: 2024-09-19
Payer: COMMERCIAL

## 2024-09-19 VITALS — DIASTOLIC BLOOD PRESSURE: 70 MMHG | WEIGHT: 166 LBS | BODY MASS INDEX: 28.48 KG/M2 | SYSTOLIC BLOOD PRESSURE: 130 MMHG

## 2024-09-19 DIAGNOSIS — Z34.93 PRENATAL CARE IN THIRD TRIMESTER: Primary | ICD-10-CM

## 2024-09-19 DIAGNOSIS — O26.843 UTERINE SIZE-DATE DISCREPANCY IN THIRD TRIMESTER: ICD-10-CM

## 2024-09-19 LAB
GLUCOSE UR STRIP-MCNC: NEGATIVE MG/DL
PROT UR STRIP-MCNC: NEGATIVE MG/DL

## 2024-09-26 ENCOUNTER — HOSPITAL ENCOUNTER (OUTPATIENT)
Dept: WOMENS IMAGING | Facility: HOSPITAL | Age: 19
Discharge: HOME | End: 2024-09-26
Admitting: OBSTETRICS & GYNECOLOGY
Payer: COMMERCIAL

## 2024-09-26 ENCOUNTER — OFFICE VISIT (OUTPATIENT)
Dept: OBSTETRICS AND GYNECOLOGY | Facility: HOSPITAL | Age: 19
End: 2024-09-26
Payer: COMMERCIAL

## 2024-09-26 VITALS — BODY MASS INDEX: 28.31 KG/M2 | SYSTOLIC BLOOD PRESSURE: 122 MMHG | DIASTOLIC BLOOD PRESSURE: 77 MMHG | WEIGHT: 165 LBS

## 2024-09-26 DIAGNOSIS — Z3A.35 35 WEEKS GESTATION OF PREGNANCY: ICD-10-CM

## 2024-09-26 DIAGNOSIS — O26.843 UTERINE SIZE-DATE DISCREPANCY IN THIRD TRIMESTER: Primary | ICD-10-CM

## 2024-09-26 DIAGNOSIS — Z34.90 PREGNANCY, UNSPECIFIED GESTATIONAL AGE: ICD-10-CM

## 2024-09-26 DIAGNOSIS — O26.843 UTERINE SIZE-DATE DISCREPANCY IN THIRD TRIMESTER: ICD-10-CM

## 2024-09-26 PROCEDURE — 76819 FETAL BIOPHYS PROFIL W/O NST: CPT

## 2024-09-26 PROCEDURE — 76820 UMBILICAL ARTERY ECHO: CPT

## 2024-09-26 PROCEDURE — 76818 FETAL BIOPHYS PROFILE W/NST: CPT

## 2024-09-26 PROCEDURE — 76816 OB US FOLLOW-UP PER FETUS: CPT

## 2024-09-26 NOTE — PROGRESS NOTES
Patient denies any leaking fluid, bleeding, or contractions   NIPT and AFP declined.  Patient states follow up with Dr. Rodas's office is 10/3/24.

## 2024-09-29 ENCOUNTER — HOSPITAL ENCOUNTER (OUTPATIENT)
Facility: HOSPITAL | Age: 19
Discharge: HOME | End: 2024-09-29
Attending: OBSTETRICS & GYNECOLOGY | Admitting: OBSTETRICS & GYNECOLOGY
Payer: COMMERCIAL

## 2024-09-29 VITALS
RESPIRATION RATE: 20 BRPM | WEIGHT: 165 LBS | SYSTOLIC BLOOD PRESSURE: 132 MMHG | BODY MASS INDEX: 28.17 KG/M2 | DIASTOLIC BLOOD PRESSURE: 89 MMHG | HEART RATE: 75 BPM | OXYGEN SATURATION: 99 % | HEIGHT: 64 IN | TEMPERATURE: 97.7 F

## 2024-09-29 PROCEDURE — 59025 FETAL NON-STRESS TEST: CPT | Performed by: OBSTETRICS & GYNECOLOGY

## 2024-09-29 PROCEDURE — 99213 OFFICE O/P EST LOW 20 MIN: CPT | Performed by: OBSTETRICS & GYNECOLOGY

## 2024-09-29 PROCEDURE — G0463 HOSPITAL OUTPT CLINIC VISIT: HCPCS

## 2024-09-29 PROCEDURE — 59025 FETAL NON-STRESS TEST: CPT

## 2024-09-29 NOTE — H&P
[facility]  Obstetric History and Physical    Chief Complaint   Patient presents with    Decreased Fetal Movement       HPI:      Patient is a 18 y.o. female  currently at 35w6d, who presents with decreased fetal movement since Friday.   She is being followed for IUGR from [facility]>   She had not felt much as far as normal movement since yesterday, but then tonight she said the infant felt like it was \"shaking\" it was moving so much.  She feels normal movements now with the fetal monitors in place.  She denies vaginal leaking and bleeding.  No contractions.         The following portions of the patients history were reviewed and updated as appropriate: current medications, allergies, past medical history, past surgical history, past family history, past social history and problem list .       Prenatal Information:   Maternal Prenatal Labs  Blood Type No results found for: \"ABO\"   Rh Status No results found for: \"RH\"   Antibody Screen No results found for: \"ABSCRN\"   Gonnorhea No results found for: \"GCCX\"   Chlamydia No results found for: \"CLAMYDCU\"   RPR No results found for: \"RPR\"   Syphilis Antibody No results found for: \"SYPHILIS\"   Rubella No results found for: \"RUBELLAIGGIN\"   Hepatitis B Surface Antigen No results found for: \"HEPBSAG\"   HIV-1 Antibody No results found for: \"LABHIV1\"   Hepatitis C Antibody No results found for: \"HEPCAB\"   Rapid Urin Drug Screen No results found for: \"AMPMETHU\", \"BARBITSCNUR\", \"LABBENZSCN\", \"LABMETHSCN\", \"LABOPIASCN\", \"THCURSCR\", \"COCAINEUR\", \"AMPHETSCREEN\", \"PROPOXSCN\", \"BUPRENORSCNU\", \"METAMPSCNUR\", \"OXYCODONESCN\", \"TRICYCLICSCN\"   Group B Strep Culture No results found for: \"GBSANTIGEN\"           External Prenatal Results       Pregnancy Outside Results - Transcribed From Office Records - See Scanned Records For Details       Test Value Date Time    ABO  O  04/15/24 1536    Rh  Positive  04/15/24 1536    Antibody Screen  Negative  24 1040       Negative  04/15/24 " 1536    Varicella IgG       Rubella  5.08 index 04/15/24 1536    Hgb  10.3 g/dL 24 1040       12.6 g/dL 24 1549       11.6 g/dL 04/15/24 1536       12.2 g/dL 24 1252    Hct  29.8 % 24 1040       37.0 % 24 1549       34.6 % 04/15/24 1536       36.6 % 24 1252    HgB A1c        1h GTT  78 mg/dL 24 1040    3h GTT Fasting       3h GTT 1 hour       3h GTT 2 hour       3h GTT 3 hour        Gonorrhea (discrete)  Negative  04/15/24 1536    Chlamydia (discrete)  Negative  04/15/24 1536    RPR  Non Reactive  24 1040       Non Reactive  04/15/24 1536    Syphils cascade: TP-Ab (FTA)       TP-Ab       TP-Ab (EIA)       TPPA       HBsAg  Negative  04/15/24 1536    Herpes Simplex Virus PCR       Herpes Simplex VIrus Culture       HIV  Non Reactive  04/15/24 1536    Hep C RNA Quant PCR       Hep C Antibody  Non Reactive  04/15/24 1536    AFP       NIPT       Cystic Fibroisis        Group B Strep       GBS Susceptibility to Clindamycin       GBS Susceptibility to Erythromycin       Fetal Fibronectin       Genetic Testing, Maternal Blood                 Drug Screening       Test Value Date Time    Urine Drug Screen       Amphetamine Screen       Barbiturate Screen       Benzodiazepine Screen       Methadone Screen       Phencyclidine Screen       Opiates Screen       THC Screen       Cocaine Screen       Propoxyphene Screen       Buprenorphine Screen       Methamphetamine Screen       Oxycodone Screen       Tricyclic Antidepressants Screen                 Legend    ^: Historical                              Past OB History:     OB History    Para Term  AB Living   1 0 0 0 0 0   SAB IAB Ectopic Molar Multiple Live Births   0 0 0 0 0 0      # Outcome Date GA Lbr Kendall/2nd Weight Sex Type Anes PTL Lv   1 Current                Past Medical History: Past Medical History:   Diagnosis Date    Allergic     Anemia     Eosinophilic esophagitis     GERD (gastroesophageal reflux  disease) 2 years ago    Pregnancy 2024      Past Surgical History Past Surgical History:   Procedure Laterality Date    LASIK Bilateral 2023    WISDOM TOOTH EXTRACTION        Family History: Family History   Problem Relation Age of Onset    No Known Problems Father     Other Mother         Gallbladder issues    Diabetes Paternal Grandmother         Type 2    Hyperlipidemia Paternal Grandmother         She has high blood pressure, Her dad had high blood pressure as well and  from stroke    Diabetes Maternal Grandmother         Type 2    Other Maternal Grandmother         Gallbladder    Cancer Maternal Grandfather         Prostate and his mom  from thyroid cancer    Cancer Maternal Aunt         Thyroid Cancer    Breast cancer Neg Hx     Ovarian cancer Neg Hx     Uterine cancer Neg Hx     Colon cancer Neg Hx       Social History:  reports that she has never smoked. She has never been exposed to tobacco smoke. She has never used smokeless tobacco.   reports no history of alcohol use.   reports no history of drug use.         Objective     Vital Signs Range for the last 24 hours  Temperature: Temp:  [97.7 °F (36.5 °C)] 97.7 °F (36.5 °C)   Temp Source: Temp src: Oral   BP: BP: (132)/(89) 132/89   Pulse: Heart Rate:  [75] 75   Respirations: Resp:  [20] 20   SPO2: SpO2:  [99 %] 99 %   O2 Amount (l/min):     O2 Devices     Weight: Weight:  [74.8 kg (165 lb)] 74.8 kg (165 lb)     Physical Examination: General appearance - alert, well appearing, and in no distress and oriented to person, place, and time  Chest -Breathing is unlaboured   Heart - Regular rate   Abdomen - soft, nontender, nondistended,  no rebound tenderness noted  No guarding, No RUQ pain  Extremities - pedal edema +1      Fetal Heart Rate Assessment   Method: Fetal HR Assessment Method: external   Beats/min: Fetal HR (beats/min): 150   Baseline: Fetal HR Baseline: normal range   Varibility: Fetal HR Variability: moderate (amplitude range 6  to 25 bpm)   Accels: Fetal HR Accelerations: lasting at least 15 seconds, greater than/equal to 15 bpm   Decels: Fetal HR Decelerations: absent   Tracing Category:       Uterine Assessment   Method: Method: external tocotransducer   Frequency (min):     Ctx Count in 10 min:     Duration:     Intensity: Contraction Intensity: no contractions   Intensity by IUPC:     Resting Tone: Uterine Resting Tone: soft by palpation   Resting Tone by IUPC:     [place] Units:      NST                     Indication:  decreased fetal movement   Start time 0030                End time: 0100  15 x 15 accels x 2  Yes  No decels  Baseline  150s   Reactive NST - Yes       Assessment/Plan  1. Intrauterine pregnancy at 35w6d weeks gestation with reactive fetal status  2. Decreased fetal movement resolved with good movement and RNST  3. Given education and reassurance   4. Questions answered   5. D/C home       Zachary Yan MD  9/29/2024  01:11 EDT

## 2024-09-30 NOTE — PROGRESS NOTES
Patient seen in Maternal Fetal Medicine clinic today. Please see full note in under imaging tab of patient chart in Epic (Viewpoint report).    Madyson Solares MD

## 2024-10-01 ENCOUNTER — LAB (OUTPATIENT)
Dept: LAB | Facility: HOSPITAL | Age: 19
End: 2024-10-01
Payer: COMMERCIAL

## 2024-10-01 ENCOUNTER — ROUTINE PRENATAL (OUTPATIENT)
Dept: OBSTETRICS AND GYNECOLOGY | Facility: CLINIC | Age: 19
End: 2024-10-01
Payer: COMMERCIAL

## 2024-10-01 VITALS — BODY MASS INDEX: 28.67 KG/M2 | DIASTOLIC BLOOD PRESSURE: 72 MMHG | SYSTOLIC BLOOD PRESSURE: 124 MMHG | WEIGHT: 167 LBS

## 2024-10-01 DIAGNOSIS — Z34.90 PRENATAL CARE, ANTEPARTUM: Primary | ICD-10-CM

## 2024-10-01 DIAGNOSIS — O26.843 UTERINE SIZE-DATE DISCREPANCY IN THIRD TRIMESTER: ICD-10-CM

## 2024-10-01 DIAGNOSIS — O99.019 MATERNAL ANEMIA IN PREGNANCY, ANTEPARTUM: ICD-10-CM

## 2024-10-01 DIAGNOSIS — Z34.90 PREGNANCY, UNSPECIFIED GESTATIONAL AGE: Primary | ICD-10-CM

## 2024-10-01 LAB
GLUCOSE UR STRIP-MCNC: NEGATIVE MG/DL
PROT UR STRIP-MCNC: NEGATIVE MG/DL

## 2024-10-01 PROCEDURE — 59025 FETAL NON-STRESS TEST: CPT | Performed by: NURSE PRACTITIONER

## 2024-10-01 PROCEDURE — 87081 CULTURE SCREEN ONLY: CPT

## 2024-10-01 PROCEDURE — 99213 OFFICE O/P EST LOW 20 MIN: CPT | Performed by: NURSE PRACTITIONER

## 2024-10-01 NOTE — PROGRESS NOTES
OB FOLLOW UP  CC- Here for care of pregnancy        Carol Ann Dominique is a 18 y.o.  36w1d patient being seen today for her obstetrical follow up visit. Patient reports epigastric pain last week not today. and mild headache today without visual changes.     Her prenatal care is complicated by (and status) : see below.  Patient Active Problem List   Diagnosis    GERD without esophagitis    Allergic conjunctivitis of both eyes    Eosinophilic esophagitis    Food allergy    Prenatal care    Maternal anemia in pregnancy, antepartum    Uterine size-date discrepancy in third trimester       GBS Status: Done Today. She is not allergic to PCN.    Allergies   Allergen Reactions    Peanut-Containing Drug Products Angioedema    Soybean-Containing Drug Products GI Intolerance          Flu Status: Declines  RSV status: declines.   Her Delivery Plan is:  PDC advises delivery at 38 wks     US today: no  Non Stress Test: Yes minutes >20  non-stress test: NST: Reactive  indication: Fetal Growth Restriction (IUGR)  category: Category I    ROS -   Patient Denies: Loss of Fluid, Vaginal Spotting, Vision Changes, Nausea , Vomiting , Contractions, and skin itching  Fetal Movement : decreased  All other systems reviewed and are negative.       The additional following portions of the patient's history were reviewed and updated as appropriate: allergies, current medications, past medical history, past social history, past surgical history, and problem list.    I have reviewed and agree with the HPI, ROS, and historical information as entered above. Shelby Selby, APRN        EXAM:     Prenatal Vitals  BP: 124/72  Weight: 75.8 kg (167 lb)   Fetal Heart Rate: NST       Dilation/Effacement/Station  Dilation: Fingertip      Urine Glucose Read-only: Negative  Urine Protein Read-only: Negative           Assessment and Plan    Problem List Items Addressed This Visit       Prenatal care - Primary    Relevant Orders    POC  Urinalysis Dipstick (Completed)    Group B Streptococcus Culture - Swab, Vaginal/Rectum    Maternal anemia in pregnancy, antepartum    Overview     Hemoglobin 10.3 at 28 weeks.  Advised iron therapy.         Relevant Medications    Ferrous Sulfate (IRON PO)    Uterine size-date discrepancy in third trimester    Overview     8/22/2024-at 30 weeks and 3 days AC noted to be 5th percentile at [facility].  Will perform twice weekly NSTs and reassess growth with them on 9/5/2024.  9/5/2024-AC is at the 14th percentile with overall growth at 47th percentile.  This is considered normal growth.  They will reassess once more on 9/26/2024            Pregnancy at 36w1d  GBS today  Fetal status reassuring.   Reviewed Pre-eclampsia signs/symptoms  Delivery options reviewed with patient  Signs of labor reviewed  Kick counts reviewed  Activity and Exercise discussed.  Return in about 3 days (around 10/4/2024) for NST.  PDC recommends delivery at 38 weeks, needs to be scheduled    Shelby Selby, NATASHA  10/01/2024

## 2024-10-04 ENCOUNTER — ROUTINE PRENATAL (OUTPATIENT)
Dept: OBSTETRICS AND GYNECOLOGY | Facility: CLINIC | Age: 19
End: 2024-10-04
Payer: COMMERCIAL

## 2024-10-04 VITALS — WEIGHT: 168.6 LBS | BODY MASS INDEX: 28.94 KG/M2 | DIASTOLIC BLOOD PRESSURE: 78 MMHG | SYSTOLIC BLOOD PRESSURE: 120 MMHG

## 2024-10-04 DIAGNOSIS — O26.843 UTERINE SIZE-DATE DISCREPANCY IN THIRD TRIMESTER: ICD-10-CM

## 2024-10-04 DIAGNOSIS — O99.019 MATERNAL ANEMIA IN PREGNANCY, ANTEPARTUM: ICD-10-CM

## 2024-10-04 DIAGNOSIS — Z34.90 PRENATAL CARE, ANTEPARTUM: Primary | ICD-10-CM

## 2024-10-04 LAB
BACTERIA SPEC AEROBE CULT: NORMAL
GLUCOSE UR STRIP-MCNC: NEGATIVE MG/DL
PROT UR STRIP-MCNC: NEGATIVE MG/DL

## 2024-10-04 NOTE — PROGRESS NOTES
OB FOLLOW UP  CC- Here for care of pregnancy        Carol Ann Dominique is a 18 y.o.  36w4d patient being seen today for her obstetrical follow up visit. Patient reports headaches relieved by rest. Denies any vision changes. Also reports pelvic pressure and pain.      Her prenatal care is complicated by (and status) :   Patient Active Problem List   Diagnosis    GERD without esophagitis    Allergic conjunctivitis of both eyes    Eosinophilic esophagitis    Food allergy    Prenatal care    Maternal anemia in pregnancy, antepartum    Uterine size-date discrepancy in third trimester       GBS Status:   Group B Strep Culture   Date Value Ref Range Status   10/01/2024 No Group B Streptococcus isolated  Final         Allergies   Allergen Reactions    Peanut-Containing Drug Products Angioedema    Soybean-Containing Drug Products GI Intolerance          Flu Status: Declines  Her Delivery Plan is:  PDC recommends delivery at 38 weeks, needs to be scheduled.      US today: no  Non Stress Test: Yes minutes 20  non-stress test: NST: Reactive  indication: Fetal Growth Restriction (IUGR)  category: Category I    ROS -   Patient Denies: Loss of Fluid, Vaginal Spotting, Vision Changes, Nausea , Vomiting , Contractions, Epigastric pain, and skin itching  Fetal Movement : normal  All other systems reviewed and are negative.       The additional following portions of the patient's history were reviewed and updated as appropriate: allergies, current medications, past family history, past medical history, past social history, past surgical history, and problem list.    I have reviewed and agree with the HPI, ROS, and historical information as entered above. Isis Rodas MD        EXAM:     Prenatal Vitals  BP: 120/78  Weight: 76.5 kg (168 lb 9.6 oz)   Fetal Heart Rate: NST              Urine Glucose Read-only: Negative  Urine Protein Read-only: Negative           Assessment and Plan    Problem List Items Addressed This  Visit          Gravid and     Prenatal care - Primary    Relevant Orders    POC Urinalysis Dipstick (Completed)    Uterine size-date discrepancy in third trimester    Overview     2024-at 30 weeks and 3 days AC noted to be 5th percentile at [facility].  Will perform twice weekly NSTs and reassess growth with them on 2024.  2024-AC is at the 14th percentile with overall growth at 47th percentile.  This is considered normal growth.  They will reassess once more on 2024-baby measuring 5 pounds 7 ounces which is 31st percentile but the AC is at 5th percentile.  PDC recommend delivery at 38 weeks.            Hematology and Neoplasia    Maternal anemia in pregnancy, antepartum    Overview     Hemoglobin 10.3 at 28 weeks.  Advised iron therapy.         Relevant Medications    Ferrous Sulfate (IRON PO)       Pregnancy at 36w4d  Fetal status reassuring.   Reviewed Pre-eclampsia signs/symptoms  Reviewed upcoming IOL with patient. Instructions given.  Delivery options reviewed with patient  Signs of labor reviewed  Kick counts reviewed  Activity and Exercise discussed.  Return in about 4 years (around 10/4/2028) for nst.    Isis Rodas MD  10/04/2024

## 2024-10-07 ENCOUNTER — HOSPITAL ENCOUNTER (INPATIENT)
Facility: HOSPITAL | Age: 19
LOS: 3 days | Discharge: HOME | End: 2024-10-10
Attending: OBSTETRICS & GYNECOLOGY | Admitting: OBSTETRICS & GYNECOLOGY
Payer: COMMERCIAL

## 2024-10-07 ENCOUNTER — ROUTINE PRENATAL (OUTPATIENT)
Dept: OBSTETRICS AND GYNECOLOGY | Facility: CLINIC | Age: 19
End: 2024-10-07
Payer: COMMERCIAL

## 2024-10-07 VITALS — SYSTOLIC BLOOD PRESSURE: 112 MMHG | DIASTOLIC BLOOD PRESSURE: 66 MMHG | BODY MASS INDEX: 28.87 KG/M2 | WEIGHT: 168.2 LBS

## 2024-10-07 DIAGNOSIS — O26.843 UTERINE SIZE-DATE DISCREPANCY IN THIRD TRIMESTER: Primary | ICD-10-CM

## 2024-10-07 DIAGNOSIS — O36.8130 DECREASED FETAL MOVEMENTS IN THIRD TRIMESTER, SINGLE OR UNSPECIFIED FETUS: ICD-10-CM

## 2024-10-07 DIAGNOSIS — Z34.93 PRENATAL CARE IN THIRD TRIMESTER: ICD-10-CM

## 2024-10-07 DIAGNOSIS — O99.019 MATERNAL ANEMIA IN PREGNANCY, ANTEPARTUM: ICD-10-CM

## 2024-10-07 DIAGNOSIS — O36.8390 VARIABLE FETAL HEART RATE DECELERATIONS, ANTEPARTUM: ICD-10-CM

## 2024-10-07 PROBLEM — Z34.90 PREGNANCY: Status: ACTIVE | Noted: 2024-10-07

## 2024-10-07 LAB
ABO GROUP BLD: NORMAL
AMPHET+METHAMPHET UR QL: NEGATIVE
AMPHETAMINES UR QL: NEGATIVE
BARBITURATES UR QL SCN: NEGATIVE
BASOPHILS # BLD AUTO: 0.03 10*3/MM3 (ref 0–0.2)
BASOPHILS NFR BLD AUTO: 0.4 % (ref 0–1.5)
BENZODIAZ UR QL SCN: NEGATIVE
BLD GP AB SCN SERPL QL: NEGATIVE
BUPRENORPHINE SERPL-MCNC: NEGATIVE NG/ML
CANNABINOIDS SERPL QL: NEGATIVE
COCAINE UR QL: NEGATIVE
DEPRECATED RDW RBC AUTO: 42.4 FL (ref 37–54)
EOSINOPHIL # BLD AUTO: 0.3 10*3/MM3 (ref 0–0.4)
EOSINOPHIL NFR BLD AUTO: 4 % (ref 0.3–6.2)
ERYTHROCYTE [DISTWIDTH] IN BLOOD BY AUTOMATED COUNT: 12.8 % (ref 12.3–15.4)
FENTANYL UR-MCNC: NEGATIVE NG/ML
GLUCOSE UR STRIP-MCNC: NEGATIVE MG/DL
HCT VFR BLD AUTO: 33 % (ref 34–46.6)
HGB BLD-MCNC: 11.6 G/DL (ref 12–15.9)
IMM GRANULOCYTES # BLD AUTO: 0.09 10*3/MM3 (ref 0–0.05)
IMM GRANULOCYTES NFR BLD AUTO: 1.2 % (ref 0–0.5)
LYMPHOCYTES # BLD AUTO: 1.48 10*3/MM3 (ref 0.7–3.1)
LYMPHOCYTES NFR BLD AUTO: 19.7 % (ref 19.6–45.3)
MCH RBC QN AUTO: 31.9 PG (ref 26.6–33)
MCHC RBC AUTO-ENTMCNC: 35.2 G/DL (ref 31.5–35.7)
MCV RBC AUTO: 90.7 FL (ref 79–97)
METHADONE UR QL SCN: NEGATIVE
MONOCYTES # BLD AUTO: 0.45 10*3/MM3 (ref 0.1–0.9)
MONOCYTES NFR BLD AUTO: 6 % (ref 5–12)
NEUTROPHILS NFR BLD AUTO: 5.15 10*3/MM3 (ref 1.7–7)
NEUTROPHILS NFR BLD AUTO: 68.7 % (ref 42.7–76)
NRBC BLD AUTO-RTO: 0 /100 WBC (ref 0–0.2)
OPIATES UR QL: NEGATIVE
OXYCODONE UR QL SCN: NEGATIVE
PCP UR QL SCN: NEGATIVE
PLATELET # BLD AUTO: 188 10*3/MM3 (ref 140–450)
PMV BLD AUTO: 10.4 FL (ref 6–12)
PROT UR STRIP-MCNC: NEGATIVE MG/DL
RBC # BLD AUTO: 3.64 10*6/MM3 (ref 3.77–5.28)
RH BLD: POSITIVE
T&S EXPIRATION DATE: NORMAL
TRICYCLICS UR QL SCN: NEGATIVE
WBC NRBC COR # BLD AUTO: 7.5 10*3/MM3 (ref 3.4–10.8)

## 2024-10-07 PROCEDURE — 86901 BLOOD TYPING SEROLOGIC RH(D): CPT | Performed by: OBSTETRICS & GYNECOLOGY

## 2024-10-07 PROCEDURE — 59025 FETAL NON-STRESS TEST: CPT | Performed by: OBSTETRICS & GYNECOLOGY

## 2024-10-07 PROCEDURE — 85025 COMPLETE CBC W/AUTO DIFF WBC: CPT | Performed by: OBSTETRICS & GYNECOLOGY

## 2024-10-07 PROCEDURE — 86780 TREPONEMA PALLIDUM: CPT | Performed by: OBSTETRICS & GYNECOLOGY

## 2024-10-07 PROCEDURE — 80307 DRUG TEST PRSMV CHEM ANLYZR: CPT | Performed by: OBSTETRICS & GYNECOLOGY

## 2024-10-07 PROCEDURE — 86900 BLOOD TYPING SEROLOGIC ABO: CPT | Performed by: OBSTETRICS & GYNECOLOGY

## 2024-10-07 PROCEDURE — 99214 OFFICE O/P EST MOD 30 MIN: CPT | Performed by: OBSTETRICS & GYNECOLOGY

## 2024-10-07 PROCEDURE — 59025 FETAL NON-STRESS TEST: CPT

## 2024-10-07 PROCEDURE — 25010000002 FENTANYL CITRATE (PF) 50 MCG/ML SOLUTION: Performed by: OBSTETRICS & GYNECOLOGY

## 2024-10-07 PROCEDURE — 59200 INSERT CERVICAL DILATOR: CPT | Performed by: OBSTETRICS & GYNECOLOGY

## 2024-10-07 PROCEDURE — 86850 RBC ANTIBODY SCREEN: CPT | Performed by: OBSTETRICS & GYNECOLOGY

## 2024-10-07 PROCEDURE — C1755 CATHETER, INTRASPINAL: HCPCS

## 2024-10-07 PROCEDURE — 51702 INSERT TEMP BLADDER CATH: CPT

## 2024-10-07 PROCEDURE — 25810000003 LACTATED RINGERS PER 1000 ML: Performed by: OBSTETRICS & GYNECOLOGY

## 2024-10-07 RX ORDER — SODIUM CHLORIDE, SODIUM LACTATE, POTASSIUM CHLORIDE, CALCIUM CHLORIDE 600; 310; 30; 20 MG/100ML; MG/100ML; MG/100ML; MG/100ML
125 INJECTION, SOLUTION INTRAVENOUS CONTINUOUS
Status: DISCONTINUED | OUTPATIENT
Start: 2024-10-07 | End: 2024-10-08

## 2024-10-07 RX ORDER — NALOXONE HCL 0.4 MG/ML
0.4 VIAL (ML) INJECTION
Status: DISCONTINUED | OUTPATIENT
Start: 2024-10-07 | End: 2024-10-08 | Stop reason: HOSPADM

## 2024-10-07 RX ORDER — MORPHINE SULFATE 2 MG/ML
1 INJECTION, SOLUTION INTRAMUSCULAR; INTRAVENOUS EVERY 4 HOURS PRN
Status: DISCONTINUED | OUTPATIENT
Start: 2024-10-07 | End: 2024-10-08 | Stop reason: SDUPTHER

## 2024-10-07 RX ORDER — FENTANYL CITRATE 50 UG/ML
100 INJECTION, SOLUTION INTRAMUSCULAR; INTRAVENOUS
Status: DISCONTINUED | OUTPATIENT
Start: 2024-10-07 | End: 2024-10-08

## 2024-10-07 RX ORDER — SODIUM CHLORIDE 0.9 % (FLUSH) 0.9 %
10 SYRINGE (ML) INJECTION EVERY 12 HOURS SCHEDULED
Status: DISCONTINUED | OUTPATIENT
Start: 2024-10-07 | End: 2024-10-08 | Stop reason: HOSPADM

## 2024-10-07 RX ORDER — LIDOCAINE HYDROCHLORIDE 10 MG/ML
0.5 INJECTION, SOLUTION EPIDURAL; INFILTRATION; INTRACAUDAL; PERINEURAL ONCE AS NEEDED
Status: DISCONTINUED | OUTPATIENT
Start: 2024-10-07 | End: 2024-10-08 | Stop reason: HOSPADM

## 2024-10-07 RX ORDER — FENTANYL CITRATE 50 UG/ML
50 INJECTION, SOLUTION INTRAMUSCULAR; INTRAVENOUS
Status: DISCONTINUED | OUTPATIENT
Start: 2024-10-07 | End: 2024-10-08

## 2024-10-07 RX ORDER — SODIUM CHLORIDE 0.9 % (FLUSH) 0.9 %
10 SYRINGE (ML) INJECTION AS NEEDED
Status: DISCONTINUED | OUTPATIENT
Start: 2024-10-07 | End: 2024-10-08 | Stop reason: HOSPADM

## 2024-10-07 RX ORDER — MAGNESIUM CARB/ALUMINUM HYDROX 105-160MG
30 TABLET,CHEWABLE ORAL ONCE
Status: DISCONTINUED | OUTPATIENT
Start: 2024-10-07 | End: 2024-10-08 | Stop reason: HOSPADM

## 2024-10-07 RX ORDER — OXYTOCIN/0.9 % SODIUM CHLORIDE 30/500 ML
2-20 PLASTIC BAG, INJECTION (ML) INTRAVENOUS
Status: DISCONTINUED | OUTPATIENT
Start: 2024-10-07 | End: 2024-10-08 | Stop reason: HOSPADM

## 2024-10-07 RX ORDER — NALOXONE HCL 0.4 MG/ML
0.4 VIAL (ML) INJECTION
Status: DISCONTINUED | OUTPATIENT
Start: 2024-10-07 | End: 2024-10-08 | Stop reason: SDUPTHER

## 2024-10-07 RX ORDER — MORPHINE SULFATE 2 MG/ML
2 INJECTION, SOLUTION INTRAMUSCULAR; INTRAVENOUS EVERY 4 HOURS PRN
Status: DISCONTINUED | OUTPATIENT
Start: 2024-10-07 | End: 2024-10-08 | Stop reason: SDUPTHER

## 2024-10-07 RX ORDER — ACETAMINOPHEN 325 MG/1
650 TABLET ORAL EVERY 4 HOURS PRN
Status: DISCONTINUED | OUTPATIENT
Start: 2024-10-07 | End: 2024-10-08 | Stop reason: SDUPTHER

## 2024-10-07 RX ADMIN — DINOPROSTONE 10 MG: 10 INSERT VAGINAL at 15:01

## 2024-10-07 RX ADMIN — SODIUM CHLORIDE, POTASSIUM CHLORIDE, SODIUM LACTATE AND CALCIUM CHLORIDE 125 ML/HR: 600; 310; 30; 20 INJECTION, SOLUTION INTRAVENOUS at 23:27

## 2024-10-07 RX ADMIN — Medication 2 MILLI-UNITS/MIN: at 23:37

## 2024-10-07 RX ADMIN — FENTANYL CITRATE 100 MCG: 50 INJECTION, SOLUTION INTRAMUSCULAR; INTRAVENOUS at 23:31

## 2024-10-07 NOTE — PROGRESS NOTES
OB FOLLOW UP  CC- Here for care of pregnancy        Carol Ann Dominique is a 18 y.o.  37w0d patient being seen today for her obstetrical follow up visit. Patient reports itching on her abdomen and legs for several weeks.     Her prenatal care is complicated by (and status) : see below.  Patient Active Problem List   Diagnosis    GERD without esophagitis    Allergic conjunctivitis of both eyes    Eosinophilic esophagitis    Food allergy    Prenatal care    Maternal anemia in pregnancy, antepartum    Uterine size-date discrepancy in third trimester       GBS Status:   Group B Strep Culture   Date Value Ref Range Status   10/01/2024 No Group B Streptococcus isolated  Final         Allergies   Allergen Reactions    Peanut-Containing Drug Products Angioedema    Soybean-Containing Drug Products GI Intolerance          Flu Status: Declines  Her Delivery Plan is:  IOL on 10/17/2024     US today: no  Non Stress Test: Yes minutes 45 minutes  non-stress test:  Reactive but with variable decelerations  indication: Fetal Growth Restriction (IUGR)    ROS -   Patient Denies: Loss of Fluid, Vaginal Spotting, Vision Changes, Headaches, Nausea , Vomiting , Contractions, and Epigastric pain  Fetal Movement : decreased  All other systems reviewed and are negative.       The additional following portions of the patient's history were reviewed and updated as appropriate: allergies and current medications.    I have reviewed and agree with the HPI, ROS, and historical information as entered above. Isis Rodas MD        EXAM:     Prenatal Vitals  BP: 112/66  Weight: 76.3 kg (168 lb 3.2 oz)   Fetal Heart Rate: NST       Dilation/Effacement/Station  Dilation: 1  Effacement (%): 50  Station: -3      Urine Glucose Read-only: Negative  Urine Protein Read-only: Negative           Assessment and Plan    Problem List Items Addressed This Visit          Gravid and     Prenatal care    Relevant Orders    POC Urinalysis  Dipstick (Completed)    Uterine size-date discrepancy in third trimester - Primary    Overview     8/22/2024-at 30 weeks and 3 days AC noted to be 5th percentile at [facility].  Will perform twice weekly NSTs and reassess growth with them on 9/5/2024.  9/5/2024-AC is at the 14th percentile with overall growth at 47th percentile.  This is considered normal growth.  They will reassess once more on 9/26/2024 9/26/2024-baby measuring 5 pounds 7 ounces which is 31st percentile but the AC is at 5th percentile.  PDC recommend delivery at 38 weeks.                Hematology and Neoplasia    Maternal anemia in pregnancy, antepartum    Overview     Hemoglobin 10.3 at 28 weeks.  Advised iron therapy.         Relevant Medications    Ferrous Sulfate (IRON PO)     Other Visit Diagnoses       Decreased fetal movements in third trimester, single or unspecified fetus        Variable fetal heart rate decelerations, antepartum                Pregnancy at 37w0d  IUGR with decreased movement and variable decelerations.  Discussed with patient recommendation for delivery today.  Cervix not favorable at 1/50/-3.  Will start with Cervidil and move to a Giron bulb with Pitocin following.    Return in about 6 weeks (around 11/18/2024).    Isis Rodas MD  10/07/2024

## 2024-10-07 NOTE — H&P
History and Physical:    Subjective     Chief Complaint   Patient presents with    Scheduled Induction       Carol Ann Dominique is a 18 y.o. year old  with an Estimated Date of Delivery: 10/28/24 currently at 37w0d presenting with  growth restriction with an AC-F 5th percentile.  Patient noted to have decreased fetal movement and 3 variable decelerations on NST today.  Decision was made for delivery. .    Prenatal care has been with Isis Rodas MD.  It has been significant for  growth restriction .      Review of Systems   Constitutional: Negative.         Decreased fetal movement   HENT: Negative.     Respiratory: Negative.     Cardiovascular: Negative.    Gastrointestinal: Negative.    Genitourinary: Negative.    Musculoskeletal: Negative.    Skin: Negative.    Allergic/Immunologic: Negative.    Neurological: Negative.    Hematological: Negative.    Psychiatric/Behavioral: Negative.             Past Medical History:   Diagnosis Date    Allergic     Anemia     Eosinophilic esophagitis     GERD (gastroesophageal reflux disease) 2 years ago    Pregnancy 2024     Past Surgical History:   Procedure Laterality Date    LASIK Bilateral 2023    WISDOM TOOTH EXTRACTION       Family History   Problem Relation Age of Onset    No Known Problems Father     Other Mother         Gallbladder issues    Diabetes Paternal Grandmother         Type 2    Hyperlipidemia Paternal Grandmother         She has high blood pressure, Her dad had high blood pressure as well and  from stroke    Diabetes Maternal Grandmother         Type 2    Other Maternal Grandmother         Gallbladder    Cancer Maternal Grandfather         Prostate and his mom  from thyroid cancer    Cancer Maternal Aunt         Thyroid Cancer    Breast cancer Neg Hx     Ovarian cancer Neg Hx     Uterine cancer Neg Hx     Colon cancer Neg Hx      Social History     Tobacco Use    Smoking status: Never     Passive exposure: Never    Smokeless  tobacco: Never   Vaping Use    Vaping status: Never Used   Substance Use Topics    Alcohol use: Never    Drug use: Never     Medications Prior to Admission   Medication Sig Dispense Refill Last Dose    Calcium Carbonate Antacid (TUMS PO) Take  by mouth.   10/7/2024    cetirizine (ZyrTEC Allergy) 10 MG tablet    10/7/2024    Ferrous Sulfate (IRON PO) Take  by mouth.   10/7/2024    fluticasone (Flonase) 50 MCG/ACT nasal spray 2 sprays into the nostril(s) as directed by provider Daily. 16 g 5 10/7/2024    prenatal vitamin (prenatal, CLASSIC, vitamin) tablet Take  by mouth Daily.   10/7/2024    VITAMIN D PO Take  by mouth.   10/7/2024    NON FORMULARY Scutellania supreme, reishi mushroom comlex, theanine, disgetive enzyme (Patient not taking: Reported on 10/1/2024)        Allergies:  Peanut-containing drug products and Soybean-containing drug products  OB History    Para Term  AB Living   1 0 0 0 0 0   SAB IAB Ectopic Molar Multiple Live Births   0 0 0 0 0 0      # Outcome Date GA Lbr [place]/2nd Weight Sex Type Anes PTL Lv   1 Current                      Objective     LMP 2024     Physical Exam    General:  No acute distress   Lung: Clear to auscultation bilaterally, no wheezing, clear at bases   Heart: Regular rate and rhythm, no murmurs    Abdomen: Gravid, nontender   Extremities: 2+ DTR's bilaterally, no edema   FHT's: reactive but with occasional variable deceleration   Cervix: 1/50/-3.  Firm.  Posterior   [place]: Contraction are none           Lab Review   External Prenatal Results       Pregnancy Outside Results - Transcribed From Office Records - See Scanned Records For Details       Test Value Date Time    ABO  O  04/15/24 1536    Rh  Positive  04/15/24 1536    Antibody Screen  Negative  24 1040       Negative  04/15/24 1536    Varicella IgG       Rubella  5.08 index 04/15/24 1536    Hgb  10.3 g/dL 24 1040       12.6 g/dL 24 1549       11.6 g/dL 04/15/24 1536       12.2 g/dL  03/14/24 1252    Hct  29.8 % 08/07/24 1040       37.0 % 04/26/24 1549       34.6 % 04/15/24 1536       36.6 % 03/14/24 1252    HgB A1c        1h GTT  78 mg/dL 08/07/24 1040    3h GTT Fasting       3h GTT 1 hour       3h GTT 2 hour       3h GTT 3 hour        Gonorrhea (discrete)  Negative  04/15/24 1536    Chlamydia (discrete)  Negative  04/15/24 1536    RPR  Non Reactive  08/07/24 1040       Non Reactive  04/15/24 1536    Syphils cascade: TP-Ab (FTA)       TP-Ab       TP-Ab (EIA)       TPPA       HBsAg  Negative  04/15/24 1536    Herpes Simplex Virus PCR       Herpes Simplex VIrus Culture       HIV  Non Reactive  04/15/24 1536    Hep C RNA Quant PCR       Hep C Antibody  Non Reactive  04/15/24 1536    AFP       NIPT       Cystic Fibroisis        Group B Strep  No Group B Streptococcus isolated  10/01/24 1808    GBS Susceptibility to Clindamycin       GBS Susceptibility to Erythromycin       Fetal Fibronectin       Genetic Testing, Maternal Blood                 Drug Screening       Test Value Date Time    Urine Drug Screen       Amphetamine Screen       Barbiturate Screen       Benzodiazepine Screen       Methadone Screen       Phencyclidine Screen       Opiates Screen       THC Screen       Cocaine Screen       Propoxyphene Screen       Buprenorphine Screen       Methamphetamine Screen       Oxycodone Screen       Tricyclic Antidepressants Screen                 Legend    ^: Historical                                Lab Results   Component Value Date    ALKPHOS 64 04/26/2024    ALT 14 04/26/2024    AST 17 04/26/2024    CREATININE 0.54 (L) 04/26/2024    BILITOT 0.2 04/26/2024     Lab Results   Component Value Date    WBC 8.08 08/07/2024    HGB 10.3 (L) 08/07/2024    HCT 29.8 (L) 08/07/2024    MCV 91.7 08/07/2024     08/07/2024        Results for orders placed in visit on 08/07/24    US OB Follow Up Transabdominal Approach    Narrative  PAT NAME: TIA MANJARREZ  [facility] REC#: [id number]  BIRTH [birth year]: [birth year]  PAT  GEND: F  ACCOUNT#: [id number]  PAT TYPE: O  EXAM CASSANDRA: [id number]  REF PHYS NATAN NUNEZ  ACCESSION [id number]      Sonographer Comments  ====================    Can not visualize 3VTV, LVOT, RVOT, and CSP due to fetal position      Indication  ========    F/u anatomy      Comparison Studies  =================    The findings of this study are compared to the prior ultrasound study dated 2024      History  ======    Previous Outcomes   1      Method  =======    [place] E6, Transabdominal ultrasound examination. View: Suboptimal view: limited by fetal position      Pregnancy  =========    [place] pregnancy. Number of fetuses: 1      Dating  ======    LMP on: 2024  GA by LMP 28 w + 2 d  ESME by LMP: 10/28/2024  Method of dating: based on stated ESME  GA by prior assessment 28 w + 2 d  ESME by prior assessment: 10/28/2024  Ultrasound examination on: 2024  GA by U/S based upon: AC, BPD, Femur, HC  GA by U/S 28 w + 6 d  ESME by U/S: 10/24/2024  Previous dating: based on stated ESME, selected on 2024  Agreed ESME of previous dating: 10/28/2024  Assigned: based on stated ESME, selected on 2024  Assigned GA 28 w + 2 d  Assigned ESME: 10/28/2024  Pregnancy length 280 d      General Evaluation  ================    Cardiac activity present.  bpm.  Fetal movements visualized.  Presentation cephalic.  Placenta Placental site: anterior, fundal.  Umbilical cord Cord vessels: 3 vessel cord.  Amniotic fluid Amount of AF: normal. MVP 3.5 cm.      Fetal Biometry  ============    Standard  BPD 74.0 mm  29w 5d        81%        Hadlock    .9 mm  28w 6d        35%        Hadlock    .3 mm  27w 4d        22%        Hadlock    Femur 55.1 mm  29w 0d        58%        Hadlock    HC / AC 1.14    EFW 1,212 g  52%        Srinivas    EFW (lb) 2 lb  EFW (oz) 11 oz  EFW by: Hadlock (BPD-HC-AC-FL)  Extremities / Bony Struc  FL / BPD 0.74    FL / HC 0.21    FL / AC 0.24    Other Structures    bpm      Fetal Anatomy  ============    Lateral ventricles: Appears normal  4-chamber view: Appears normal  RVOT view: not visualized  LVOT view: suboptimal  Heart / Thorax  3-vessel view: Appears normal  3-vessel-trachea view: not visualized  Diaphragm: Appears normal  Stomach: Appears normal  Kidneys: Appears normal  Bladder: Appears normal  Cervical spine: Appears normal  Thoracic spine: Appears normal  Lumbar spine: Appears normal  Sacral spine: Appears normal  Gender: male  Wants to know gender: yes      Impression  ==========    Male fetus in cephalic presentation fetal heart rate of 138. Anterior placenta and three-vessel cord noted. Normal fluid volume. Estimated fetal weight 2 pounds 11  ounces which is 52nd percentile. Fetus in back position. That is making it difficult for heart views. Still unable to adequately visualize right ventricular outflow track left  ventricular outflow tract or three-vessel tracheal view.      Recommendation  ===============    Discussed with Dr. Milligan. Recommend follow-up with [facility] for evaluation.        Sonographer: RT CYNTHIA Vazquez, [facility]  Physician: Isis Rodas MD, FACOG    Electronically signed by: Isis Rodas MD, FACOG at: 2024/08/07 10:02              Patient Active Problem List    Diagnosis Date Noted    Pregnancy 10/07/2024    Uterine size-date discrepancy in third trimester 08/22/2024     Note Last Updated: 10/7/2024     8/22/2024-at 30 weeks and 3 days AC noted to be 5th percentile at [facility].  Will perform twice weekly NSTs and reassess growth with them on 9/5/2024.  9/5/2024-AC is at the 14th percentile with overall growth at 47th percentile.  This is considered normal growth.  They will reassess once more on 9/26/2024 9/26/2024-baby measuring 5 pounds 7 ounces which is 31st percentile but the AC is at 5th percentile.  [facility] recommend delivery at 38 weeks.          Maternal anemia in pregnancy, antepartum 08/08/2024     Note Last Updated: 8/8/2024     Hemoglobin  10.3 at 28 weeks.  Advised iron therapy.      Prenatal care 05/13/2024    Food allergy 12/07/2023    Allergic conjunctivitis of both eyes 09/19/2022    Eosinophilic esophagitis 01/28/2022    GERD without esophagitis 04/02/2021        Assessment & Plan     ASSESSMENT  IUP at 37w0d  induction of labor  Growth restriction  Fetal decelerations   3. GBBS negative    PLAN  Admit to labor and delivery   2.  pitocin, de la torre bulb, and cervidil         Isis Rodas MD  10/7/2024@

## 2024-10-07 NOTE — PROGRESS NOTES
Patient comfortable with Cervidil.  Feeling mild contractions  Afebrile vital signs stable  Fetal heart tones category 1  [place] irregular  A/P: Continue induction of labor.  Cervidil until 9 PM.  Will allow patient to eat and Place cervical Giron bulb and low-dose Pitocin.  Start increasing Pitocin at 3 AM.  Epidural when desires.

## 2024-10-08 ENCOUNTER — ANESTHESIA (OUTPATIENT)
Dept: LABOR AND DELIVERY | Facility: HOSPITAL | Age: 19
End: 2024-10-08
Payer: COMMERCIAL

## 2024-10-08 ENCOUNTER — ANESTHESIA EVENT (OUTPATIENT)
Dept: LABOR AND DELIVERY | Facility: HOSPITAL | Age: 19
End: 2024-10-08
Payer: COMMERCIAL

## 2024-10-08 LAB — TREPONEMA PALLIDUM IGG+IGM AB [PRESENCE] IN SERUM OR PLASMA BY IMMUNOASSAY: NORMAL

## 2024-10-08 PROCEDURE — 59410 OBSTETRICAL CARE: CPT | Performed by: OBSTETRICS & GYNECOLOGY

## 2024-10-08 PROCEDURE — C1755 CATHETER, INTRASPINAL: HCPCS | Performed by: ANESTHESIOLOGY

## 2024-10-08 PROCEDURE — 25810000003 LACTATED RINGERS PER 1000 ML: Performed by: OBSTETRICS & GYNECOLOGY

## 2024-10-08 PROCEDURE — 25010000002 LIDOCAINE-EPINEPHRINE 2 %-1:200000 SOLUTION: Performed by: ANESTHESIOLOGY

## 2024-10-08 PROCEDURE — 25010000002 BUPIVACAINE (PF) 0.25 % SOLUTION: Performed by: ANESTHESIOLOGY

## 2024-10-08 PROCEDURE — 59025 FETAL NON-STRESS TEST: CPT

## 2024-10-08 PROCEDURE — 25010000002 ONDANSETRON PER 1 MG: Performed by: OBSTETRICS & GYNECOLOGY

## 2024-10-08 PROCEDURE — 25810000003 LACTATED RINGERS SOLUTION: Performed by: OBSTETRICS & GYNECOLOGY

## 2024-10-08 PROCEDURE — 25010000002 FENTANYL CITRATE (PF) 50 MCG/ML SOLUTION: Performed by: ANESTHESIOLOGY

## 2024-10-08 PROCEDURE — 25010000002 ROPIVACAINE PER 1 MG: Performed by: ANESTHESIOLOGY

## 2024-10-08 PROCEDURE — 25010000002 LIDOCAINE-EPINEPHRINE 1.5 %-1:200000 SOLUTION: Performed by: ANESTHESIOLOGY

## 2024-10-08 RX ORDER — IBUPROFEN 600 MG/1
600 TABLET, FILM COATED ORAL EVERY 6 HOURS PRN
Status: DISCONTINUED | OUTPATIENT
Start: 2024-10-08 | End: 2024-10-10 | Stop reason: HOSPADM

## 2024-10-08 RX ORDER — HYDROCODONE BITARTRATE AND ACETAMINOPHEN 10; 325 MG/1; MG/1
1 TABLET ORAL EVERY 4 HOURS PRN
Status: DISCONTINUED | OUTPATIENT
Start: 2024-10-08 | End: 2024-10-10 | Stop reason: HOSPADM

## 2024-10-08 RX ORDER — DOCUSATE SODIUM 100 MG/1
100 CAPSULE, LIQUID FILLED ORAL 2 TIMES DAILY
Status: DISCONTINUED | OUTPATIENT
Start: 2024-10-08 | End: 2024-10-10 | Stop reason: HOSPADM

## 2024-10-08 RX ORDER — HYDROCODONE BITARTRATE AND ACETAMINOPHEN 5; 325 MG/1; MG/1
1 TABLET ORAL EVERY 4 HOURS PRN
Status: DISCONTINUED | OUTPATIENT
Start: 2024-10-08 | End: 2024-10-10 | Stop reason: HOSPADM

## 2024-10-08 RX ORDER — HYDROCORTISONE 25 MG/G
1 CREAM TOPICAL AS NEEDED
Status: DISCONTINUED | OUTPATIENT
Start: 2024-10-08 | End: 2024-10-10 | Stop reason: HOSPADM

## 2024-10-08 RX ORDER — ONDANSETRON 2 MG/ML
4 INJECTION INTRAMUSCULAR; INTRAVENOUS EVERY 6 HOURS PRN
Status: DISCONTINUED | OUTPATIENT
Start: 2024-10-08 | End: 2024-10-10 | Stop reason: HOSPADM

## 2024-10-08 RX ORDER — LIDOCAINE HCL/EPINEPHRINE/PF 2%-1:200K
VIAL (ML) INJECTION AS NEEDED
Status: DISCONTINUED | OUTPATIENT
Start: 2024-10-08 | End: 2024-10-08 | Stop reason: SURG

## 2024-10-08 RX ORDER — CARBOPROST TROMETHAMINE 250 UG/ML
250 INJECTION, SOLUTION INTRAMUSCULAR AS NEEDED
Status: DISCONTINUED | OUTPATIENT
Start: 2024-10-08 | End: 2024-10-08 | Stop reason: HOSPADM

## 2024-10-08 RX ORDER — DIPHENHYDRAMINE HYDROCHLORIDE 50 MG/ML
12.5 INJECTION INTRAMUSCULAR; INTRAVENOUS EVERY 8 HOURS PRN
Status: DISCONTINUED | OUTPATIENT
Start: 2024-10-08 | End: 2024-10-08 | Stop reason: HOSPADM

## 2024-10-08 RX ORDER — SODIUM CHLORIDE 0.9 % (FLUSH) 0.9 %
1-10 SYRINGE (ML) INJECTION AS NEEDED
Status: DISCONTINUED | OUTPATIENT
Start: 2024-10-08 | End: 2024-10-10 | Stop reason: HOSPADM

## 2024-10-08 RX ORDER — BUPIVACAINE HYDROCHLORIDE 2.5 MG/ML
INJECTION, SOLUTION EPIDURAL; INFILTRATION; INTRACAUDAL AS NEEDED
Status: DISCONTINUED | OUTPATIENT
Start: 2024-10-08 | End: 2024-10-08 | Stop reason: SURG

## 2024-10-08 RX ORDER — ONDANSETRON 2 MG/ML
4 INJECTION INTRAMUSCULAR; INTRAVENOUS EVERY 6 HOURS PRN
Status: DISCONTINUED | OUTPATIENT
Start: 2024-10-08 | End: 2024-10-08

## 2024-10-08 RX ORDER — METHYLERGONOVINE MALEATE 0.2 MG/ML
200 INJECTION INTRAVENOUS ONCE AS NEEDED
Status: DISCONTINUED | OUTPATIENT
Start: 2024-10-08 | End: 2024-10-08 | Stop reason: HOSPADM

## 2024-10-08 RX ORDER — METOCLOPRAMIDE HYDROCHLORIDE 5 MG/ML
10 INJECTION INTRAMUSCULAR; INTRAVENOUS ONCE AS NEEDED
Status: DISCONTINUED | OUTPATIENT
Start: 2024-10-08 | End: 2024-10-08 | Stop reason: HOSPADM

## 2024-10-08 RX ORDER — OXYTOCIN/0.9 % SODIUM CHLORIDE 30/500 ML
125 PLASTIC BAG, INJECTION (ML) INTRAVENOUS ONCE AS NEEDED
Status: DISCONTINUED | OUTPATIENT
Start: 2024-10-08 | End: 2024-10-10 | Stop reason: HOSPADM

## 2024-10-08 RX ORDER — ACETAMINOPHEN 325 MG/1
650 TABLET ORAL EVERY 6 HOURS PRN
Status: DISCONTINUED | OUTPATIENT
Start: 2024-10-08 | End: 2024-10-10 | Stop reason: HOSPADM

## 2024-10-08 RX ORDER — LIDOCAINE HYDROCHLORIDE AND EPINEPHRINE 15; 5 MG/ML; UG/ML
INJECTION, SOLUTION EPIDURAL AS NEEDED
Status: DISCONTINUED | OUTPATIENT
Start: 2024-10-08 | End: 2024-10-08 | Stop reason: SURG

## 2024-10-08 RX ORDER — ONDANSETRON 2 MG/ML
4 INJECTION INTRAMUSCULAR; INTRAVENOUS ONCE AS NEEDED
Status: DISCONTINUED | OUTPATIENT
Start: 2024-10-08 | End: 2024-10-08 | Stop reason: HOSPADM

## 2024-10-08 RX ORDER — FAMOTIDINE 10 MG/ML
20 INJECTION, SOLUTION INTRAVENOUS ONCE AS NEEDED
Status: DISCONTINUED | OUTPATIENT
Start: 2024-10-08 | End: 2024-10-08 | Stop reason: HOSPADM

## 2024-10-08 RX ORDER — ONDANSETRON 4 MG/1
4 TABLET, ORALLY DISINTEGRATING ORAL EVERY 6 HOURS PRN
Status: DISCONTINUED | OUTPATIENT
Start: 2024-10-08 | End: 2024-10-10 | Stop reason: HOSPADM

## 2024-10-08 RX ORDER — EPHEDRINE SULFATE 5 MG/ML
INJECTION INTRAVENOUS
Status: DISCONTINUED
Start: 2024-10-08 | End: 2024-10-10 | Stop reason: HOSPADM

## 2024-10-08 RX ORDER — MISOPROSTOL 200 UG/1
800 TABLET ORAL AS NEEDED
Status: DISCONTINUED | OUTPATIENT
Start: 2024-10-08 | End: 2024-10-10 | Stop reason: HOSPADM

## 2024-10-08 RX ORDER — PROMETHAZINE HYDROCHLORIDE 12.5 MG/1
12.5 TABLET ORAL EVERY 4 HOURS PRN
Status: DISCONTINUED | OUTPATIENT
Start: 2024-10-08 | End: 2024-10-10 | Stop reason: HOSPADM

## 2024-10-08 RX ORDER — IBUPROFEN 600 MG/1
600 TABLET, FILM COATED ORAL EVERY 6 HOURS PRN
Status: DISCONTINUED | OUTPATIENT
Start: 2024-10-08 | End: 2024-10-08 | Stop reason: HOSPADM

## 2024-10-08 RX ORDER — EPHEDRINE SULFATE 5 MG/ML
10 INJECTION INTRAVENOUS
Status: DISCONTINUED | OUTPATIENT
Start: 2024-10-08 | End: 2024-10-08 | Stop reason: HOSPADM

## 2024-10-08 RX ORDER — ACETAMINOPHEN 325 MG/1
650 TABLET ORAL EVERY 4 HOURS PRN
Status: DISCONTINUED | OUTPATIENT
Start: 2024-10-08 | End: 2024-10-08 | Stop reason: HOSPADM

## 2024-10-08 RX ORDER — CITRIC ACID/SODIUM CITRATE 334-500MG
30 SOLUTION, ORAL ORAL ONCE
Status: DISCONTINUED | OUTPATIENT
Start: 2024-10-08 | End: 2024-10-08 | Stop reason: HOSPADM

## 2024-10-08 RX ORDER — HYDROCODONE BITARTRATE AND ACETAMINOPHEN 5; 325 MG/1; MG/1
1 TABLET ORAL EVERY 4 HOURS PRN
Status: DISCONTINUED | OUTPATIENT
Start: 2024-10-08 | End: 2024-10-08 | Stop reason: HOSPADM

## 2024-10-08 RX ORDER — MISOPROSTOL 200 UG/1
800 TABLET ORAL AS NEEDED
Status: DISCONTINUED | OUTPATIENT
Start: 2024-10-08 | End: 2024-10-08 | Stop reason: HOSPADM

## 2024-10-08 RX ORDER — HYDROCODONE BITARTRATE AND ACETAMINOPHEN 10; 325 MG/1; MG/1
1 TABLET ORAL EVERY 4 HOURS PRN
Status: DISCONTINUED | OUTPATIENT
Start: 2024-10-08 | End: 2024-10-08 | Stop reason: HOSPADM

## 2024-10-08 RX ORDER — OXYTOCIN/0.9 % SODIUM CHLORIDE 30/500 ML
999 PLASTIC BAG, INJECTION (ML) INTRAVENOUS ONCE
Status: DISCONTINUED | OUTPATIENT
Start: 2024-10-08 | End: 2024-10-08 | Stop reason: HOSPADM

## 2024-10-08 RX ORDER — OXYTOCIN/0.9 % SODIUM CHLORIDE 30/500 ML
250 PLASTIC BAG, INJECTION (ML) INTRAVENOUS CONTINUOUS
Status: ACTIVE | OUTPATIENT
Start: 2024-10-08 | End: 2024-10-08

## 2024-10-08 RX ORDER — ROPIVACAINE HYDROCHLORIDE 2 MG/ML
15 INJECTION, SOLUTION EPIDURAL; INFILTRATION; PERINEURAL CONTINUOUS
Status: DISCONTINUED | OUTPATIENT
Start: 2024-10-08 | End: 2024-10-08

## 2024-10-08 RX ORDER — FENTANYL CITRATE 50 UG/ML
INJECTION, SOLUTION INTRAMUSCULAR; INTRAVENOUS AS NEEDED
Status: DISCONTINUED | OUTPATIENT
Start: 2024-10-08 | End: 2024-10-08 | Stop reason: SURG

## 2024-10-08 RX ORDER — BISACODYL 10 MG
10 SUPPOSITORY, RECTAL RECTAL DAILY PRN
Status: DISCONTINUED | OUTPATIENT
Start: 2024-10-09 | End: 2024-10-10 | Stop reason: HOSPADM

## 2024-10-08 RX ADMIN — ROPIVACAINE HYDROCHLORIDE 14 ML/HR: 2 INJECTION, SOLUTION EPIDURAL; INFILTRATION at 04:05

## 2024-10-08 RX ADMIN — SODIUM CHLORIDE, POTASSIUM CHLORIDE, SODIUM LACTATE AND CALCIUM CHLORIDE 75 ML/HR: 600; 310; 30; 20 INJECTION, SOLUTION INTRAVENOUS at 08:06

## 2024-10-08 RX ADMIN — SODIUM CHLORIDE, POTASSIUM CHLORIDE, SODIUM LACTATE AND CALCIUM CHLORIDE 75 ML/HR: 600; 310; 30; 20 INJECTION, SOLUTION INTRAVENOUS at 13:42

## 2024-10-08 RX ADMIN — BUPIVACAINE HYDROCHLORIDE 8 ML: 2.5 INJECTION, SOLUTION EPIDURAL; INFILTRATION; INTRACAUDAL; PERINEURAL at 03:59

## 2024-10-08 RX ADMIN — LIDOCAINE HYDROCHLORIDE AND EPINEPHRINE 3 ML: 15; 5 INJECTION, SOLUTION EPIDURAL at 03:57

## 2024-10-08 RX ADMIN — ROPIVACAINE HYDROCHLORIDE 15 ML/HR: 2 INJECTION, SOLUTION EPIDURAL; INFILTRATION at 16:43

## 2024-10-08 RX ADMIN — LIDOCAINE HYDROCHLORIDE AND EPINEPHRINE 2 ML: 15; 5 INJECTION, SOLUTION EPIDURAL at 03:58

## 2024-10-08 RX ADMIN — ONDANSETRON 4 MG: 2 INJECTION INTRAMUSCULAR; INTRAVENOUS at 02:21

## 2024-10-08 RX ADMIN — LIDOCAINE HYDROCHLORIDE,EPINEPHRINE BITARTRATE 6 ML: 20; .005 INJECTION, SOLUTION EPIDURAL; INFILTRATION; INTRACAUDAL; PERINEURAL at 14:51

## 2024-10-08 RX ADMIN — SODIUM CHLORIDE, POTASSIUM CHLORIDE, SODIUM LACTATE AND CALCIUM CHLORIDE 1000 ML: 600; 310; 30; 20 INJECTION, SOLUTION INTRAVENOUS at 03:40

## 2024-10-08 RX ADMIN — FENTANYL CITRATE 100 MCG: 50 INJECTION, SOLUTION INTRAMUSCULAR; INTRAVENOUS at 03:59

## 2024-10-08 RX ADMIN — WITCH HAZEL: 500 SOLUTION RECTAL; TOPICAL at 20:44

## 2024-10-08 RX ADMIN — IBUPROFEN 600 MG: 600 TABLET, FILM COATED ORAL at 20:44

## 2024-10-08 RX ADMIN — Medication: at 20:44

## 2024-10-08 RX ADMIN — Medication 85 ML/HR: at 18:53

## 2024-10-08 NOTE — ANESTHESIA PREPROCEDURE EVALUATION
Anesthesia Evaluation     Patient summary reviewed and Nursing notes reviewed                Airway   Mallampati: II  TM distance: >3 FB  Neck ROM: full  No difficulty expected  Dental      Pulmonary - negative pulmonary ROS   Cardiovascular - negative cardio ROS        Neuro/Psych- negative ROS  GI/Hepatic/Renal/Endo    (+) GERD    Musculoskeletal (-) negative ROS    Abdominal    Substance History - negative use     OB/GYN    (+) Pregnant        Other                    Anesthesia Plan    ASA 2     epidural       Anesthetic plan, risks, benefits, and alternatives have been provided, discussed and informed consent has been obtained with: patient.    Use of blood products discussed with patient .      CODE STATUS:    Level Of Support Discussed With: Patient  Code Status (Patient has no pulse and is not breathing): CPR (Attempt to Resuscitate)  Medical Interventions (Patient has pulse or is breathing): Full Support

## 2024-10-08 NOTE — PROGRESS NOTES
Patient comfortable with epidural  AFVSS  FHTs cat 1. [place] q2  Cvx with some bleeding  AROM clear  IUPC placed without difficulty  Cvx 4/70/-2  A/P cont iol

## 2024-10-08 NOTE — L&D DELIVERY NOTE
" [facility]   Vaginal Delivery Note    Patient Name: Carol Ann Dominique  : 2005  MRN: [id number]    Date of Delivery: 10/8/2024     Diagnosis     Pre & Post-Delivery:  Intrauterine pregnancy at 37w1d  Labor status: Induced Onset of Labor     Maternal anemia in pregnancy, antepartum    Uterine size-date discrepancy in third trimester    Pregnancy     (spontaneous vaginal delivery)             Problem List    Transfer to Postpartum     Review the Delivery Report for details.     Delivery     Delivery:      YOB: 2025    Time of Birth:  Gestational Age 5:45 PM   37w1d     Anesthesia: Epidural     Delivering clinician: Isis Rodas    Forceps?   No   Vacuum? No    Shoulder dystocia present: No        Delivery narrative: Patient pushed for approximately 30 minutes and delivered spontaneously over an intact perineum.  Shoulders and body easily followed.  Baby placed on maternal abdomen.  After 1 minute, the cord was clamped x 2.  Father cut cord.  Cord blood was obtained.  Placenta delivered spontaneously and intact.  Small left periurethral repaired with a 3-0 Vicryl.  All sponge, lap, needle counts were correct x 2.      Infant     Findings: male  infant     Infant observations: Weight: 2955 g (6 lb 8.2 oz)   Length: 19.75  in  Observations/Comments:        Apgars: 9  @ 1 minute /    9  @ 5 minutes   Infant Name: Mauricio     Placenta & Cord         Placenta delivered  Spontaneous  at   10/8/2024  5:49 PM     Cord: 3 vessels  present.   Nuchal Cord?  no   Cord blood obtained: Yes    Cord gases obtained:  No    Cord gas results: Venous:  No results found for: \"PHCVEN\", \"BECVEN\"    Arterial:  No results found for: \"PHCART\", \"BECART\"     Repair     Episiotomy: None     No    Lacerations: Yes  Laceration Information  Laceration Repaired?   Perineal: None      Periurethral: left  Yes    Labial:       Sulcus:       Vaginal:       Cervical:         Suture used for repair: 3-0 Vicryl   " Bleeding after dental extractions   Estimated Blood Loss: Est. Blood Loss (mL): 200 mL (Filed from Delivery Summary) (10/08/24 7694)     Quantitative Blood Loss:          Complications     none    Disposition     Mother to Mother Baby/Postpartum  in stable condition currently.  Baby to remains with mom  in stable condition currently.    Isis Rodas MD  10/08/24  18:06 EDT         hypotension bleeding from the mouth nausea and vomiting bleeding from mouth

## 2024-10-08 NOTE — ANESTHESIA PROCEDURE NOTES
Labor Epidural      Patient reassessed immediately prior to procedure    Patient location during procedure: OB  Performed By  Anesthesiologist: Dougie Walters DO  Preanesthetic Checklist  Completed: patient identified, IV checked, site marked, risks and benefits discussed, surgical consent, monitors and equipment checked, pre-op evaluation and timeout performed  Prep:  Pt Position:sitting  Sterile Tech:gloves, mask, sterile barrier and cap  Prep:chlorhexidine gluconate and isopropyl alcohol  Monitoring:blood pressure monitoring and continuous pulse oximetry  Epidural Block Procedure:  Approach:midline  Guidance:landmark technique and palpation technique  Location:L3-L4  Needle Type:Tuohy  Needle Gauge:17 G  Loss of Resistance Medium: air  Loss of Resistance: 4cm  Cath Depth at skin:10 cm  Paresthesia: none  Aspiration:negative  Test Dose:negative  Number of Attempts: 1  Post Assessment:  Dressing:secured with tape and occlusive dressing applied (Tegaderm Placed)  Pt Tolerance:patient tolerated the procedure well with no apparent complications  Complications:no

## 2024-10-09 LAB
BASOPHILS # BLD AUTO: 0.03 10*3/MM3 (ref 0–0.2)
BASOPHILS NFR BLD AUTO: 0.2 % (ref 0–1.5)
DEPRECATED RDW RBC AUTO: 41.7 FL (ref 37–54)
EOSINOPHIL # BLD AUTO: 0.17 10*3/MM3 (ref 0–0.4)
EOSINOPHIL NFR BLD AUTO: 1.1 % (ref 0.3–6.2)
ERYTHROCYTE [DISTWIDTH] IN BLOOD BY AUTOMATED COUNT: 12.4 % (ref 12.3–15.4)
HCT VFR BLD AUTO: 30 % (ref 34–46.6)
HGB BLD-MCNC: 10.4 G/DL (ref 12–15.9)
IMM GRANULOCYTES # BLD AUTO: 0.08 10*3/MM3 (ref 0–0.05)
IMM GRANULOCYTES NFR BLD AUTO: 0.5 % (ref 0–0.5)
LYMPHOCYTES # BLD AUTO: 1.64 10*3/MM3 (ref 0.7–3.1)
LYMPHOCYTES NFR BLD AUTO: 10.6 % (ref 19.6–45.3)
MCH RBC QN AUTO: 31.9 PG (ref 26.6–33)
MCHC RBC AUTO-ENTMCNC: 34.7 G/DL (ref 31.5–35.7)
MCV RBC AUTO: 92 FL (ref 79–97)
MONOCYTES # BLD AUTO: 0.94 10*3/MM3 (ref 0.1–0.9)
MONOCYTES NFR BLD AUTO: 6.1 % (ref 5–12)
NEUTROPHILS NFR BLD AUTO: 12.64 10*3/MM3 (ref 1.7–7)
NEUTROPHILS NFR BLD AUTO: 81.5 % (ref 42.7–76)
NRBC BLD AUTO-RTO: 0 /100 WBC (ref 0–0.2)
PLATELET # BLD AUTO: 179 10*3/MM3 (ref 140–450)
PMV BLD AUTO: 10.6 FL (ref 6–12)
RBC # BLD AUTO: 3.26 10*6/MM3 (ref 3.77–5.28)
WBC NRBC COR # BLD AUTO: 15.5 10*3/MM3 (ref 3.4–10.8)

## 2024-10-09 PROCEDURE — 0503F POSTPARTUM CARE VISIT: CPT

## 2024-10-09 PROCEDURE — 85025 COMPLETE CBC W/AUTO DIFF WBC: CPT | Performed by: OBSTETRICS & GYNECOLOGY

## 2024-10-09 RX ADMIN — DOCUSATE SODIUM 100 MG: 100 CAPSULE, LIQUID FILLED ORAL at 10:22

## 2024-10-09 RX ADMIN — IBUPROFEN 600 MG: 600 TABLET, FILM COATED ORAL at 06:25

## 2024-10-09 NOTE — ANESTHESIA POSTPROCEDURE EVALUATION
Patient: Carol Ann Dominique    Procedure Summary       Date: 10/08/24 Room / Location:     Anesthesia Start: 0349 Anesthesia Stop: 1750    Procedure: LABOR ANALGESIA Diagnosis:     Scheduled Providers:  Provider: Elvie Herron DO    Anesthesia Type: epidural ASA Status: 2            Anesthesia Type: epidural    Vitals  Vitals Value Taken Time   /56 10/09/24 0735   Temp 98.6 °F (37 °C) 10/09/24 0735   Pulse 72 10/09/24 0735   Resp 18 10/09/24 0735   SpO2             Post Anesthesia Care and Evaluation    Patient location during evaluation: bedside  Patient participation: complete - patient participated  Level of consciousness: awake and alert  Pain management: adequate    Airway patency: patent  Anesthetic complications: No anesthetic complications    Cardiovascular status: acceptable  Respiratory status: acceptable  Hydration status: acceptable  Post Neuraxial Block status: Motor and sensory function returned to baseline and No signs or symptoms of PDPH

## 2024-10-09 NOTE — PAYOR COMM NOTE
"Carol Ann Manjarrez (18 y.o. Female)     From:Tonie Mack LPN, Utilization Review  Phone #[phone]  Fax #[phone]      [employer] ID#[id number]     Delivery information:  Vaginal Delivery 10/8/24 @ 17:45  Wt 2955 gms  Male  Apgars 9/9          Date of Birth   2005    Social Security Number       Address   [address] [place] [address]    Home Phone   [phone]    MRN   [id number]       Yazidi   Rastafarian    Marital Status                               Admission Date   10/7/24    Admission Type   Elective    Admitting Provider   Isis Rodas MD    Attending Provider   Isis Rodas MD    Department, Room/Bed   [facility] MOTHER BABY 4A, N416/1       Discharge Date       Discharge Disposition       Discharge Destination                                 Attending Provider: Isis Rodas MD    Allergies: Peanut-containing Drug Products, Soybean-containing Drug Products    Isolation: None   Infection: None   Code Status: CPR    Ht: 162.6 cm (64\")   Wt: 76.3 kg (168 lb 3.2 oz)    Admission Cmt: None   Principal Problem: None                  Active Insurance as of 10/7/2024       Primary Coverage       Payor Plan Insurance Group Employer/Plan Group    [employer] [place] [employer]        Payor Plan Address Payor Plan Phone Number Payor Plan Fax Number Effective Dates    [address] [address] [phone]  3/10/2021 - None Entered    [facility] [id number]         Subscriber Name Subscriber Birth Date Member ID       CAROL ANN MANJARREZ 2005 [id number]                     Emergency Contacts        (Rel.) Home Phone Work Phone Mobile Phone    JAMES MANJARREZ (Mother) [phone] -- [phone]    ESTELITA GARDINER (Spouse) [phone] -- [phone]    ULISSES MANJARREZ (Father) -- -- [phone]              Insurance Information                  [facility]/[employer] MEDICAID Phone: [phone]    Subscriber: " Carol Ann Dominique Subscriber#: [id number]    Group#: -- Precert#: --             History & Physical        Isis Rodas MD at 10/07/24 1402          History and Physical:    Subjective    Chief Complaint   Patient presents with    Scheduled Induction       Carol Ann Dominique is a 18 y.o. year old  with an Estimated Date of Delivery: 10/28/24 currently at 37w0d presenting with  growth restriction with an AC-F 5th percentile.  Patient noted to have decreased fetal movement and 3 variable decelerations on NST today.  Decision was made for delivery. .    Prenatal care has been with Isis Rodas MD.  It has been significant for  growth restriction .      Review of Systems   Constitutional: Negative.         Decreased fetal movement   HENT: Negative.     Respiratory: Negative.     Cardiovascular: Negative.    Gastrointestinal: Negative.    Genitourinary: Negative.    Musculoskeletal: Negative.    Skin: Negative.    Allergic/Immunologic: Negative.    Neurological: Negative.    Hematological: Negative.    Psychiatric/Behavioral: Negative.             Past Medical History:   Diagnosis Date    Allergic     Anemia     Eosinophilic esophagitis     GERD (gastroesophageal reflux disease) 2 years ago    Pregnancy 2024     Past Surgical History:   Procedure Laterality Date    LASIK Bilateral 2023    WISDOM TOOTH EXTRACTION       Family History   Problem Relation Age of Onset    No Known Problems Father     Other Mother         Gallbladder issues    Diabetes Paternal Grandmother         Type 2    Hyperlipidemia Paternal Grandmother         She has high blood pressure, Her dad had high blood pressure as well and  from stroke    Diabetes Maternal Grandmother         Type 2    Other Maternal Grandmother         Gallbladder    Cancer Maternal Grandfather         Prostate and his mom  from thyroid cancer    Cancer Maternal Aunt         Thyroid Cancer    Breast cancer Neg Hx     Ovarian cancer  Neg Hx     Uterine cancer Neg Hx     Colon cancer Neg Hx      Social History     Tobacco Use    Smoking status: Never     Passive exposure: Never    Smokeless tobacco: Never   Vaping Use    Vaping status: Never Used   Substance Use Topics    Alcohol use: Never    Drug use: Never     Medications Prior to Admission   Medication Sig Dispense Refill Last Dose    Calcium Carbonate Antacid (TUMS PO) Take  by mouth.   10/7/2024    cetirizine (ZyrTEC Allergy) 10 MG tablet    10/7/2024    Ferrous Sulfate (IRON PO) Take  by mouth.   10/7/2024    fluticasone (Flonase) 50 MCG/ACT nasal spray 2 sprays into the nostril(s) as directed by provider Daily. 16 g 5 10/7/2024    prenatal vitamin (prenatal, CLASSIC, vitamin) tablet Take  by mouth Daily.   10/7/2024    VITAMIN D PO Take  by mouth.   10/7/2024    NON FORMULARY Scutellania supreme, reishi mushroom comlex, theanine, disgetive enzyme (Patient not taking: Reported on 10/1/2024)        Allergies:  Peanut-containing drug products and Soybean-containing drug products  OB History    Para Term  AB Living   1 0 0 0 0 0   SAB IAB Ectopic Molar Multiple Live Births   0 0 0 0 0 0      # Outcome Date GA Lbr Kendall/2nd Weight Sex Type Anes PTL Lv   1 Current                      Objective    LMP 2024     Physical Exam    General:  No acute distress   Lung: Clear to auscultation bilaterally, no wheezing, clear at bases   Heart: Regular rate and rhythm, no murmurs    Abdomen: Gravid, nontender   Extremities: 2+ DTR's bilaterally, no edema   FHT's: reactive but with occasional variable deceleration   Cervix: 1/50/-3.  Firm.  Posterior   [place]: Contraction are none           Lab Review   External Prenatal Results       Pregnancy Outside Results - Transcribed From Office Records - See Scanned Records For Details       Test Value Date Time    ABO  O  04/15/24 1536    Rh  Positive  04/15/24 1536    Antibody Screen  Negative  24 1040       Negative  04/15/24 1536     Varicella IgG       Rubella  5.08 index 04/15/24 1536    Hgb  10.3 g/dL 08/07/24 1040       12.6 g/dL 04/26/24 1549       11.6 g/dL 04/15/24 1536       12.2 g/dL 03/14/24 1252    Hct  29.8 % 08/07/24 1040       37.0 % 04/26/24 1549       34.6 % 04/15/24 1536       36.6 % 03/14/24 1252    HgB A1c        1h GTT  78 mg/dL 08/07/24 1040    3h GTT Fasting       3h GTT 1 hour       3h GTT 2 hour       3h GTT 3 hour        Gonorrhea (discrete)  Negative  04/15/24 1536    Chlamydia (discrete)  Negative  04/15/24 1536    RPR  Non Reactive  08/07/24 1040       Non Reactive  04/15/24 1536    Syphils cascade: TP-Ab (FTA)       TP-Ab       TP-Ab (EIA)       TPPA       HBsAg  Negative  04/15/24 1536    Herpes Simplex Virus PCR       Herpes Simplex VIrus Culture       HIV  Non Reactive  04/15/24 1536    Hep C RNA Quant PCR       Hep C Antibody  Non Reactive  04/15/24 1536    AFP       NIPT       Cystic Fibroisis        Group B Strep  No Group B Streptococcus isolated  10/01/24 1808    GBS Susceptibility to Clindamycin       GBS Susceptibility to Erythromycin       Fetal Fibronectin       Genetic Testing, Maternal Blood                 Drug Screening       Test Value Date Time    Urine Drug Screen       Amphetamine Screen       Barbiturate Screen       Benzodiazepine Screen       Methadone Screen       Phencyclidine Screen       Opiates Screen       THC Screen       Cocaine Screen       Propoxyphene Screen       Buprenorphine Screen       Methamphetamine Screen       Oxycodone Screen       Tricyclic Antidepressants Screen                 Legend    ^: Historical                                Lab Results   Component Value Date    ALKPHOS 64 04/26/2024    ALT 14 04/26/2024    AST 17 04/26/2024    CREATININE 0.54 (L) 04/26/2024    BILITOT 0.2 04/26/2024     Lab Results   Component Value Date    WBC 8.08 08/07/2024    HGB 10.3 (L) 08/07/2024    HCT 29.8 (L) 08/07/2024    MCV 91.7 08/07/2024     08/07/2024        Results for  orders placed in visit on 24     OB Follow Up Transabdominal Approach    Narrative  PAT NAME: TIA MANJARREZ  [facility] REC#: [id number]  BIRTH DA: 2005  PAT GEND: F  ACCOUNT#: [id number]  PAT TYPE: O  EXAM CASSANDRA: [id number]  REF PHYS NATAN NUNEZ  ACCESSION [id number]      Sonographer Comments  ====================    Can not visualize 3VTV, LVOT, RVOT, and CSP due to fetal position      Indication  ========    F/u anatomy      Comparison Studies  =================    The findings of this study are compared to the prior ultrasound study dated 2024      History  ======    Previous Outcomes   1      Method  =======    [place]on E6, Transabdominal ultrasound examination. View: Suboptimal view: limited by fetal position      Pregnancy  =========    Laureano pregnancy. Number of fetuses: 1      Dating  ======    LMP on: 2024  GA by LMP 28 w + 2 d  ESME by LMP: 10/28/2024  Method of dating: based on stated ESME  GA by prior assessment 28 w + 2 d  ESME by prior assessment: 10/28/2024  Ultrasound examination on: 2024  GA by U/S based upon: AC, BPD, Femur, HC  GA by U/S 28 w + 6 d  ESME by U/S: 10/24/2024  Previous dating: based on stated ESME, selected on 2024  Agreed ESME of previous dating: 10/28/2024  Assigned: based on stated ESME, selected on 2024  Assigned GA 28 w + 2 d  Assigned ESME: 10/28/2024  Pregnancy length 280 d      General Evaluation  ================    Cardiac activity present.  bpm.  Fetal movements visualized.  Presentation cephalic.  Placenta Placental site: anterior, fundal.  Umbilical cord Cord vessels: 3 vessel cord.  Amniotic fluid Amount of AF: normal. MVP 3.5 cm.      Fetal Biometry  ============    Standard  BPD 74.0 mm  29w 5d        81%        Hadlock    .9 mm  28w 6d        35%        Hadlock    .3 mm  27w 4d        22%        Hadlock    Femur 55.1 mm  29w 0d        58%        Hadlock    HC / AC 1.14    EFW 1,212 g  52%         Srinivas    EFW (lb) 2 lb  EFW (oz) 11 oz  EFW by: Hadlock (BPD-HC-AC-FL)  Extremities / Bony Struc  FL / BPD 0.74    FL / HC 0.21    FL / AC 0.24    Other Structures   bpm      Fetal Anatomy  ============    Lateral ventricles: Appears normal  4-chamber view: Appears normal  RVOT view: not visualized  LVOT view: suboptimal  Heart / Thorax  3-vessel view: Appears normal  3-vessel-trachea view: not visualized  Diaphragm: Appears normal  Stomach: Appears normal  Kidneys: Appears normal  Bladder: Appears normal  Cervical spine: Appears normal  Thoracic spine: Appears normal  Lumbar spine: Appears normal  Sacral spine: Appears normal  Gender: male  Wants to know gender: yes      Impression  ==========    Male fetus in cephalic presentation fetal heart rate of 138. Anterior placenta and three-vessel cord noted. Normal fluid volume. Estimated fetal weight 2 pounds 11  ounces which is 52nd percentile. Fetus in back position. That is making it difficult for heart views. Still unable to adequately visualize right ventricular outflow track left  ventricular outflow tract or three-vessel tracheal view.      Recommendation  ===============    Discussed with Dr. Milligan. Recommend follow-up with [facility] for evaluation.        Sonographer: RT CYNTHIA Vazquez, [facility]  Physician: Isis Rodas MD, FACOG    Electronically signed by: Isis Rodas MD, FACOG at: 2024/08/07 10:02              Patient Active Problem List    Diagnosis Date Noted    Pregnancy 10/07/2024    Uterine size-date discrepancy in third trimester 08/22/2024     Note Last Updated: 10/7/2024     8/22/2024-at 30 weeks and 3 days AC noted to be 5th percentile at [facility].  Will perform twice weekly NSTs and reassess growth with them on 9/5/2024.  9/5/2024-AC is at the 14th percentile with overall growth at 47th percentile.  This is considered normal growth.  They will reassess once more on 9/26/2024 9/26/2024-baby measuring 5 pounds 7 ounces which is 31st percentile but  the AC is at 5th percentile.  [facility] recommend delivery at 38 weeks.          Maternal anemia in pregnancy, antepartum 08/08/2024     Note Last Updated: 8/8/2024     Hemoglobin 10.3 at 28 weeks.  Advised iron therapy.      Prenatal care 05/13/2024    Food allergy 12/07/2023    Allergic conjunctivitis of both eyes 09/19/2022    Eosinophilic esophagitis 01/28/2022    GERD without esophagitis 04/02/2021        Assessment & Plan    ASSESSMENT  IUP at 37w0d  induction of labor  Growth restriction  Fetal decelerations   3. GBBS negative    PLAN  Admit to labor and delivery   2.  pitocin, de la torre bulb, and cervidil         Isis Rodas MD  10/7/2024@    Electronically signed by Isis Rodas MD at 10/08/24 0710       Facility-Administered Medications as of 10/9/2024   Medication Dose Route Frequency Provider Last Rate Last Admin    acetaminophen (TYLENOL) tablet 650 mg  650 mg Oral Q6H PRN Isis Rodas MD        benzocaine (AMERICAINE) 20 % rectal ointment 1 Application  1 Application Rectal PRN Isis Rodas MD        benzocaine-menthol (DERMOPLAST) 20-0.5 % topical spray   Topical PRN Isis Rodas MD   Given at 10/08/24 2044    bisacodyl (DULCOLAX) suppository 10 mg  10 mg Rectal Daily PRN Isis Rodas MD        [COMPLETED] dinoprostone (CERVIDIL) vaginal insert 10 mg  10 mg Vaginal Once Isis Rodas MD   10 mg at 10/07/24 1501    docusate sodium (COLACE) capsule 100 mg  100 mg Oral BID Isis Rodas MD        ePHEDrine Sulfate (Pressors) 5 MG/ML injection  - ADS Override Pull             HYDROcodone-acetaminophen (NORCO) 5-325 MG per tablet 1 tablet  1 tablet Oral Q4H PRN Isis Rodas MD        Or    HYDROcodone-acetaminophen (NORCO)  MG per tablet 1 tablet  1 tablet Oral Q4H PRN Isis Rodas MD        Hydrocortisone (Perianal) (ANUSOL-HC) 2.5 % rectal cream 1 Application  1 Application Rectal PRN Isis Rodas MD         ibuprofen (ADVIL,MOTRIN) tablet 600 mg  600 mg Oral Q6H PRN Isis Rodas MD   600 mg at 10/09/24 0625    [COMPLETED] lactated ringers bolus 1,000 mL  1,000 mL Intravenous Once Isis Rodas MD 2,000 mL/hr at 10/08/24 0340 1,000 mL at 10/08/24 0340    lanolin topical 1 Application  1 Application Topical Q1H PRN Isis Rodas MD        magnesium hydroxide (MILK OF MAGNESIA) 400 MG/5ML suspension 30 mL  30 mL Oral Daily PRN Isis Rodas MD        miSOPROStol (CYTOTEC) tablet 800 mcg  800 mcg Vaginal PRN Isis Rodas MD        ondansetron ODT (ZOFRAN-ODT) disintegrating tablet 4 mg  4 mg Oral Q6H PRN Isis Rodas MD        Or    ondansetron (ZOFRAN) injection 4 mg  4 mg Intravenous Q6H PRN Isis Rodas MD        [] oxytocin (PITOCIN) 30 units in 0.9% sodium chloride 500 mL (premix)  250 mL/hr Intravenous Continuous Isis Rodas MD 85 mL/hr at 10/08/24 1853 85 mL/hr at 10/08/24 1853    oxytocin (PITOCIN) 30 units in 0.9% sodium chloride 500 mL (premix)  125 mL/hr Intravenous Once PRN Isis Rodas MD        promethazine (PHENERGAN) tablet 12.5 mg  12.5 mg Oral Q4H PRN Isis Rodas MD        sodium chloride 0.9 % flush 1-10 mL  1-10 mL Intravenous PRN Isis Rodas MD        witch hazel-glycerin (TUCKS) pad   Topical PRN Isis Rodas MD   Given at 10/08/24 2044     Orders (last 48 hrs)        Start     Ordered    10/09/24 0800  Sitz Bath  3 Times Daily        Comments: PRN    10/08/24 2006    10/09/24 0600  CBC & Differential  Timed        Comments: Postpartum Day 1      10/08/24 2006    10/09/24 0600  CBC Auto Differential  PROCEDURE ONCE        Comments: Postpartum Day 1      10/08/24 2202    10/09/24 0000  bisacodyl (DULCOLAX) suppository 10 mg  Daily PRN         10/08/24 2006    10/08/24 2100  docusate sodium (COLACE) capsule 100 mg  2 Times Daily         10/08/24 2006    10/08/24 2007  Code  Status and Medical Interventions: CPR (Attempt to Resuscitate); Full  Continuous         10/08/24 2006    10/08/24 2007  Vital Signs Per hospital policy  Per Hospital Policy         10/08/24 2006    10/08/24 2007  Notify Physician  Until Discontinued         10/08/24 2006    10/08/24 2007  Up Ad Kyra  Until Discontinued         10/08/24 2006    10/08/24 2007  Ambulate Patient  Every Shift       10/08/24 2006    10/08/24 2007  Diet: Regular/House; Fluid Consistency: Thin (IDDSI 0)  Diet Effective Now         10/08/24 2006    10/08/24 2007  Advance Diet As Tolerated -Regular  Until Discontinued         10/08/24 2006    10/08/24 2007  Fundal and Lochia Check  Per Hospital Policy        Comments: Q 15 min x 4, Q 30 min x 2, then Q Shift    10/08/24 2006    10/08/24 2007  RN to Assess Rh Status & Place RhIG Evaluation Order if Indicated  Continuous         10/08/24 2006    10/08/24 2007  Bladder Assessment  Per Order Details        Comments: Postpartum 1) Upon Admission to Unit & Every 4 Hours PRN Until Voiding. 2) Out of Bed to Void in 8 Hours.    10/08/24 2006    10/08/24 2007  Straight Cath  Per Order Details        Comments: Postpartum: If Distended & Unable to Void, May Repeat Once.    10/08/24 2006    10/08/24 2007  Indwelling Urinary Catheter  Per Order Details        Comments: Postpartum : After Straight Cathed x2 or if Greater Than 1000mL Residual, Insert Indwelling Urinary Catheter Until Further MD Order.    10/08/24 2006    10/08/24 2007  Waffle Cushion  Once        Comments: For perineal discomfort    10/08/24 2006    10/08/24 2007  Breast pump to bed  Once         10/08/24 2006    10/08/24 2007  If indicated -- Please administer RH Immunoglobulin based on results of cord blood evaluation and fetal screen lab tests, pharmacy to dispense  Continuous        Comments: See process instructions for reference range details.    10/08/24 2006    10/08/24 2007  VTE Prophylaxis Not Indicated: Low Risk; No Risk Factors  "(0)  Once         10/08/24 2006    10/08/24 2006  HYDROcodone-acetaminophen (NORCO) 5-325 MG per tablet 1 tablet  Every 4 Hours PRN        Placed in \"Or\" Linked Group    10/08/24 2006    10/08/24 2006  HYDROcodone-acetaminophen (NORCO)  MG per tablet 1 tablet  Every 4 Hours PRN        Placed in \"Or\" Linked Group    10/08/24 2006    10/08/24 2006  miSOPROStol (CYTOTEC) tablet 800 mcg  As Needed         10/08/24 2006    10/08/24 2006  magnesium hydroxide (MILK OF MAGNESIA) 400 MG/5ML suspension 30 mL  Daily PRN         10/08/24 2006    10/08/24 2006  lanolin topical 1 Application  Every 1 Hour PRN         10/08/24 2006    10/08/24 2006  benzocaine-menthol (DERMOPLAST) 20-0.5 % topical spray  As Needed         10/08/24 2006    10/08/24 2006  witch hazel-glycerin (TUCKS) pad  As Needed         10/08/24 2006    10/08/24 2006  Hydrocortisone (Perianal) (ANUSOL-HC) 2.5 % rectal cream 1 Application  As Needed         10/08/24 2006    10/08/24 2006  benzocaine (AMERICAINE) 20 % rectal ointment 1 Application  As Needed         10/08/24 2006    10/08/24 2006  ondansetron ODT (ZOFRAN-ODT) disintegrating tablet 4 mg  Every 6 Hours PRN        Placed in \"Or\" Linked Group    10/08/24 2006    10/08/24 2006  ondansetron (ZOFRAN) injection 4 mg  Every 6 Hours PRN        Placed in \"Or\" Linked Group    10/08/24 2006    10/08/24 2006  promethazine (PHENERGAN) tablet 12.5 mg  Every 4 Hours PRN         10/08/24 2006    10/08/24 2006  sodium chloride 0.9 % flush 1-10 mL  As Needed         10/08/24 2006    10/08/24 2006  oxytocin (PITOCIN) 30 units in 0.9% sodium chloride 500 mL (premix)  Once As Needed         10/08/24 2006    10/08/24 2006  ibuprofen (ADVIL,MOTRIN) tablet 600 mg  Every 6 Hours PRN         10/08/24 2006    10/08/24 2006  acetaminophen (TYLENOL) tablet 650 mg  Every 6 Hours PRN         10/08/24 2006    10/08/24 1945  oxytocin (PITOCIN) 30 units in 0.9% sodium chloride 500 mL (premix)  Continuous        Placed in " "\"Followed by\" Linked Group    10/08/24 1835    10/08/24 1930  oxytocin (PITOCIN) 30 units in 0.9% sodium chloride 500 mL (premix)  Once,   Status:  Discontinued        Placed in \"Followed by\" Linked Group    10/08/24 1835    10/08/24 1836  Notify Physician (specified)  Until Discontinued         10/08/24 1835    10/08/24 1836  Vital Signs Per hospital policy  Per Hospital Policy         10/08/24 1835    10/08/24 1836  Up as Tolerated  Until Discontinued         10/08/24 1835    10/08/24 1836  Fundal & Lochia Check  Per Order Details        Comments: Every 15 Minutes x4, Then Every 30 Minutes x2, Then Every Shift    10/08/24 1835    10/08/24 1836  Diet: Regular/House; Fluid Consistency: Thin (IDDSI 0)  Diet Effective Now,   Status:  Canceled         10/08/24 1835    10/08/24 1836  Nurse may remove epidural catheter after delivery.  Until Discontinued         10/08/24 1835    10/08/24 1836  Transfer to postpartum when discharge criteria met.  Until Discontinued         10/08/24 1835    10/08/24 1835  Strict Intake and Output  Every Shift       10/08/24 1835    10/08/24 1835  Fundal & Lochia Check  Every Shift       10/08/24 1835    10/08/24 1835  acetaminophen (TYLENOL) tablet 650 mg  Every 4 Hours PRN,   Status:  Discontinued         10/08/24 1835    10/08/24 1835  ibuprofen (ADVIL,MOTRIN) tablet 600 mg  Every 6 Hours PRN,   Status:  Discontinued         10/08/24 1835    10/08/24 1835  HYDROcodone-acetaminophen (NORCO) 5-325 MG per tablet 1 tablet  Every 4 Hours PRN,   Status:  Discontinued         10/08/24 1835    10/08/24 1835  HYDROcodone-acetaminophen (NORCO)  MG per tablet 1 tablet  Every 4 Hours PRN,   Status:  Discontinued         10/08/24 1835    10/08/24 1835  methylergonovine (METHERGINE) injection 200 mcg  Once As Needed,   Status:  Discontinued         10/08/24 1835    10/08/24 1835  carboprost (HEMABATE) injection 250 mcg  As Needed,   Status:  Discontinued         10/08/24 1835    10/08/24 1835  " miSOPROStol (CYTOTEC) tablet 800 mcg  As Needed,   Status:  Discontinued         10/08/24 1835    10/08/24 1801  Transfer Patient  Once         10/08/24 1804    10/08/24 0530  ropivacaine (NAROPIN) 0.2 % injection  Continuous,   Status:  Discontinued         10/08/24 0435    10/08/24 0530  Sod Citrate-Citric Acid (BICITRA) oral solution 30 mL  Once,   Status:  Discontinued         10/08/24 0435    10/08/24 0436  Vital Signs Per Anesthesia Guidelines  Per Order Details,   Status:  Canceled        Comments: Every 3 Minutes x20 Minutes Following Epidural Dosing, Then Every 15 Minutes If Stable    10/08/24 0435    10/08/24 0436  Start IV with #16 or #18 gauge angiocath.  Once,   Status:  Canceled         10/08/24 0435    10/08/24 0436  Nurse or anesthesiologist to remain with patient for 15 minutes following dosing.  Until Discontinued,   Status:  Canceled         10/08/24 0435    10/08/24 0436  Facilitate maternal postion on side and maintain uterine displacement.  Until Discontinued,   Status:  Canceled         10/08/24 0435    10/08/24 0436  Consult anesthesia services prior to changing epidural infusion/rate.  Until Discontinued,   Status:  Canceled         10/08/24 0435    10/08/24 0435  lactated ringers bolus 1,000 mL  As Needed,   Status:  Discontinued         10/08/24 0435    10/08/24 0435  ePHEDrine Sulfate (Pressors) 5 MG/ML injection 10 mg  Every 10 Minutes PRN,   Status:  Discontinued         10/08/24 0435    10/08/24 0435  metoclopramide (REGLAN) injection 10 mg  Once As Needed,   Status:  Discontinued         10/08/24 0435    10/08/24 0435  ondansetron (ZOFRAN) injection 4 mg  Once As Needed,   Status:  Discontinued         10/08/24 0435    10/08/24 0435  famotidine (PEPCID) injection 20 mg  Once As Needed,   Status:  Discontinued         10/08/24 0435    10/08/24 0435  diphenhydrAMINE (BENADRYL) injection 12.5 mg  Every 8 Hours PRN,   Status:  Discontinued         10/08/24 0435    10/08/24 0307  ePHEDrine  Sulfate (Pressors) 5 MG/ML injection  - ADS Override Pull        Note to Pharmacy: Created by cabinet override    10/08/24 0307    10/08/24 0215  ondansetron (ZOFRAN) injection 4 mg  Every 6 Hours PRN,   Status:  Discontinued         10/08/24 0216    10/07/24 2321  fentaNYL citrate (PF) (SUBLIMAZE) injection 100 mcg  Every 1 Hour PRN,   Status:  Discontinued         10/07/24 2321    10/07/24 2320  fentaNYL citrate (PF) (SUBLIMAZE) injection 50 mcg  Every 1 Hour PRN,   Status:  Discontinued         10/07/24 2321    10/07/24 2200  oxytocin (PITOCIN) 30 units in 0.9% sodium chloride 500 mL (premix)  Titrated,   Status:  Discontinued         10/07/24 1401    10/07/24 2100  sodium chloride 0.9 % flush 10 mL  Every 12 Hours Scheduled,   Status:  Discontinued         10/07/24 1401    10/07/24 1600  Vital Signs q 4 while awake  Every 4 Hours,   Status:  Canceled      Comments: While the patient is awake.    10/07/24 1401    10/07/24 1500  lactated ringers bolus 1,000 mL  Once         10/07/24 1401    10/07/24 1500  lactated ringers infusion  Continuous,   Status:  Discontinued         10/07/24 1401    10/07/24 1500  mineral oil liquid 30 mL  Once,   Status:  Discontinued         10/07/24 1401    10/07/24 1500  dinoprostone (CERVIDIL) vaginal insert 10 mg  Once         10/07/24 1401    10/07/24 1445  Fentanyl, Urine - Urine, Clean Catch  Once         10/07/24 1444    10/07/24 1359  Admit To Obstetrics Inpatient  Once         10/07/24 1401    10/07/24 1359  Code Status and Medical Interventions: CPR (Attempt to Resuscitate); Full Support  Continuous,   Status:  Canceled         10/07/24 1401    10/07/24 1359  Obtain informed consent  Once,   Status:  Canceled         10/07/24 1401    10/07/24 1359  Vital Signs Per hospital policy  Per Hospital Policy,   Status:  Canceled         10/07/24 1401    10/07/24 1359  Mini- prep prior to delivery  Once,   Status:  Canceled         10/07/24 1401    10/07/24 1359  Continuous Fetal  Monitoring With NST on Admission and Prior to Initiation of Oxytocin.  Per Order Details,   Status:  Canceled        Comments: Continuous Fetal Monitoring With NST on Admission & Prior to Initiation of Oxytocin.    10/07/24 1401    10/07/24 1359  External Uterine Contraction Monitoring  Per Hospital Policy,   Status:  Canceled         10/07/24 1401    10/07/24 1359  Notify Physician (specified)  Until Discontinued,   Status:  Canceled         10/07/24 1401    10/07/24 1359  Notify physician for hyperstimulus (per hospital algorithm)  Until Discontinued,   Status:  Canceled         10/07/24 1401    10/07/24 1359  Notify physician if membranes ruptured, bleeding greater than 1 pad an hour, fetal heart tone abnormality, and severe pain  Until Discontinued,   Status:  Canceled         10/07/24 1401    10/07/24 1359  May Ambulate  Until Discontinued,   Status:  Canceled        Comments: If membrane intact, or head engaged with ruptured BOW or if tracing was normal for 20 minutes.    10/07/24 1401    10/07/24 1359  Initiate Group Beta Strep (GBS) Prophylaxis Protocol, If Criteria Met  Continuous,   Status:  Canceled        Comments: NO TREATMENT RECOMMENDED IF: 1)  Maternal GBS status known negative 2)  Scheduled  birth with intact membranes, not in labor.  3 ) Maternal GBS unknown, no risk factors.   TREAT WITH ANTIBIOTICS IF:  1)  Maternal GBS status is known postive.  2)  Maternal GBS status unknown with these risk factors:  a)  Previous infant affected by GBS infection.  b)  GBS urinary tract infection (UTI) or bacteruria during pregnancy  c)  Unexplained maternal fever in labor (greater than or equal to 100.4F or 38.0C)  d)  Prolonged rupture of the membranes greater than or equal to 18 hours.  e)  Gestational age less than 37 weeks.    10/07/24 1401    10/07/24 1359  NPO Diet NPO Type: Ice Chips  Diet Effective Now,   Status:  Canceled         10/07/24 1401    10/07/24 1359  Treponema pallidum AB w/Reflex  "RPR  Once         10/07/24 1401    10/07/24 1359  CBC & Differential  Once         10/07/24 1401    10/07/24 1359  Urine Drug Screen - Urine, Clean Catch  Once         10/07/24 1401    10/07/24 1359  Type & Screen  Once         10/07/24 1401    10/07/24 1359  Insert Peripheral IV  Once,   Status:  Canceled         10/07/24 1401    10/07/24 1359  Saline Lock & Maintain IV Access  Continuous,   Status:  Canceled         10/07/24 1401    10/07/24 1359  VTE Prophylaxis Not Indicated: Low Risk; No Risk Factors (0)  Once         10/07/24 1401    10/07/24 1359  CBC Auto Differential  PROCEDURE ONCE         10/07/24 1401    10/07/24 1358  morphine injection 2 mg  Every 4 Hours PRN,   Status:  Discontinued        Placed in \"And\" Linked Group    10/07/24 1401    10/07/24 1358  naloxone (NARCAN) injection 0.4 mg  Every 5 Minutes PRN,   Status:  Discontinued        Placed in \"And\" Linked Group    10/07/24 1401    10/07/24 1358  morphine injection 1 mg  Every 4 Hours PRN,   Status:  Discontinued        Placed in \"And\" Linked Group    10/07/24 1401    10/07/24 1358  naloxone (NARCAN) injection 0.4 mg  Every 5 Minutes PRN,   Status:  Discontinued        Placed in \"And\" Linked Group    10/07/24 1401    10/07/24 1358  Position change  As Needed,   Status:  Canceled      Comments: For intra-uterine resuscitation for hypertonus, hypertstimulation, or non-reassuring fetal status    10/07/24 1401    10/07/24 1358  sodium chloride 0.9 % flush 10 mL  As Needed,   Status:  Discontinued         10/07/24 1401    10/07/24 1358  lidocaine PF 1% (XYLOCAINE) injection 0.5 mL  Once As Needed,   Status:  Discontinued         10/07/24 1401    10/07/24 1358  acetaminophen (TYLENOL) tablet 650 mg  Every 4 Hours PRN,   Status:  Discontinued         10/07/24 1401    Unscheduled  Apply Ice to Perineum  As Needed       10/08/24 1835    Unscheduled  Bladder Assessment  As Needed       10/08/24 1835    Unscheduled  Apply Ice to Perineum  As Needed    "   Comments: For 20 min q 2 hrs    10/08/24 2006    Unscheduled  Kpad  As Needed      Comments: For pain    10/08/24 2006    Unscheduled  Warm compress  As Needed       10/08/24 2006    Unscheduled  Apply ace wrap, tight bra, or binder  As Needed       10/08/24 2006    Unscheduled  Apply ice packs  As Needed       10/08/24 2006                     Operative/Procedure Notes (last 48 hours)        Phillip Jerez MD at 10/07/24 5327        Pre-procedure Diagnoses    1. 37 weeks gestation of pregnancy [Z3A.37]    2. IUGR (intrauterine growth restriction) affecting care of mother, third trimester, fetus 1 [O36.5931]    3. Rigid cervix (uteri) affecting pregnancy, third trimester [O34.43]               Post-procedure Diagnoses    1. 37 weeks gestation of pregnancy [Z3A.37]    2. IUGR (intrauterine growth restriction) affecting care of mother, third trimester, fetus 1 [O36.5931]    3. Rigid cervix (uteri) affecting pregnancy, third trimester [O34.43]               Procedures    1. INSERTION OF CERVICAL DILATOR [OBO3 (Custom)]                 Transcervical Giron placed per physician request.  FHT's class 1.  Patient tolerated well. 24 french, 60ml.    Phillip Jerez MD  10/7/2024  22:48 EDT       Electronically signed by Phillip Jerez MD at 10/07/24 [id number]       Isis Rodas MD at 10/08/24 1806           [facility]   Vaginal Delivery Note    Patient Name: Carol Ann Dominique  : 2005  MRN: [id number]    Date of Delivery: 10/8/2024     Diagnosis     Pre & Post-Delivery:  Intrauterine pregnancy at 37w1d  Labor status: Induced Onset of Labor     Maternal anemia in pregnancy, antepartum    Uterine size-date discrepancy in third trimester    Pregnancy     (spontaneous vaginal delivery)             Problem List    Transfer to Postpartum     Review the Delivery Report for details.     Delivery     Delivery:      YOB: 2025    Time of Birth:  Gestational Age 5:45 PM   37w1d     Anesthesia:  "Epidural     Delivering clinician: Isis Rodas    Forceps?   No   Vacuum? No    Shoulder dystocia present: No        Delivery narrative: Patient pushed for approximately 30 minutes and delivered spontaneously over an intact perineum.  Shoulders and body easily followed.  Baby placed on maternal abdomen.  After 1 minute, the cord was clamped x 2.  Father cut cord.  Cord blood was obtained.  Placenta delivered spontaneously and intact.  Small left periurethral repaired with a 3-0 Vicryl.  All sponge, lap, needle counts were correct x 2.      Infant     Findings: male  infant     Infant observations: Weight: 2955 g (6 lb 8.2 oz)   Length: 19.75  in  Observations/Comments:        Apgars: 9  @ 1 minute /    9  @ 5 minutes   Infant Name: Mauricio     Placenta & Cord         Placenta delivered  Spontaneous  at   10/8/2024  5:49 PM     Cord: 3 vessels  present.   Nuchal Cord?  no   Cord blood obtained: Yes    Cord gases obtained:  No    Cord gas results: Venous:  No results found for: \"PHCVEN\", \"BECVEN\"    Arterial:  No results found for: \"PHCART\", \"BECART\"     Repair     Episiotomy: None     No    Lacerations: Yes  Laceration Information  Laceration Repaired?   Perineal: None      Periurethral: left  Yes    Labial:       Sulcus:       Vaginal:       Cervical:         Suture used for repair: 3-0 Vicryl     Estimated Blood Loss: Est. Blood Loss (mL): 200 mL (Filed from Delivery Summary) (10/08/24 1745)     Quantitative Blood Loss:          Complications     none    Disposition     Mother to Mother Baby/Postpartum  in stable condition currently.  Baby to remains with mom  in stable condition currently.    Isis Rodas MD  10/08/24  18:06 EDT          Electronically signed by Isis Rodas MD at 10/08/24 9604          Physician Progress Notes (last 48 hours)        Isis Rodas MD at 10/08/24 0710          Patient comfortable with epidural  AFVSS  FHTs cat 1. [place] q2  Cvx with some " bleeding  AROM clear  IUPC placed without difficulty  Cvx 4/70/-2  A/P cont iol      Electronically signed by Isis Rodas MD at 10/08/24 0711       Isis Rodas MD at 10/07/24 8964          Patient comfortable with Cervidil.  Feeling mild contractions  Afebrile vital signs stable  Fetal heart tones category 1  [place] irregular  A/P: Continue induction of labor.  Cervidil until 9 PM.  Will allow patient to eat and Place cervical Giron bulb and low-dose Pitocin.  Start increasing Pitocin at 3 AM.  Epidural when desires.    Electronically signed by Isis Rodas MD at 10/07/24 9471

## 2024-10-09 NOTE — LACTATION NOTE
10/09/24 1210   Maternal Information   Date of Referral 10/09/24   Person Making Referral lactation consultant   Maternal Reason for Referral separation from infant   Infant Reason for Referral NICU admission   Maternal Assessment   Left Nipple Symptoms nontender   Right Nipple Symptoms nontender   Maternal Infant Feeding   Maternal Emotional State receptive   Support Person Involvement actively supporting mother   Milk Expression/Equipment   Breast Pump Type manual pump;double electric, hospital grade;double electric, personal  (using manual when LC entered, set up hospital pump, has spectra for home)   Equipment for Home Use breast pump ordered through insurance   Breast Pumping   Breast Pumping Interventions early pumping promoted;frequent pumping encouraged   Lactation Referrals   Lactation Referrals outpatient lactation program   Outpatient Lactation Program Lactation Follow-up Date/Time after NICU d/c     Courtesy visit for newly postpartum MOB with infant just transferred to NICU. He originally went to obs, then returned to floor, and now has been admitted. NICU education packet reviewed (already had at bedside from last night). Using manual pump when LC entered, set up hospital pump and reviewed use. Pumping questions answered. Encouraged to call as needs arise.

## 2024-10-09 NOTE — PROGRESS NOTES
Postpartum Progress Note    Patient name: Carol Ann Dominique  YOB: 2005   MRN: [id number]  Referring Provider: Isis Rodas MD  Admission Date: 10/7/2024  Date of Service: 10/9/2024    ID: 18 y.o.     Diagnosis:   S/p vaginal delivery     Maternal anemia in pregnancy, antepartum    Uterine size-date discrepancy in third trimester    Pregnancy     (spontaneous vaginal delivery)       Subjective:      No complaints.  Moderate lochia.  Ambulating, voiding, tolerating diet.  Pain well controlled.  The patient is currently breastfeeding.   This baby is a male. No circ.    Objective:      Vital signs:  Vital Signs Range for the last 24 hours  Temperature: Temp:  [97.8 °F (36.6 °C)-99 °F (37.2 °C)] 98.6 °F (37 °C)   Temp Source: Temp src: Oral   BP: BP: ()/(55-81) 100/56   Pulse: Heart Rate:  [] 72   Respirations: Resp:  [16-18] 18   Weight:       General: Alert & oriented x4, in no apparent distress  Abdomen: soft, nontender  Uterus: firm, nontender  Extremities: nontender; no edema      Labs:  Lab Results   Component Value Date    WBC 15.50 (H) 10/09/2024    HGB 10.4 (L) 10/09/2024    HCT 30.0 (L) 10/09/2024    MCV 92.0 10/09/2024     10/09/2024     Results from last 7 days   Lab Units 10/07/24  1438   ABO TYPING  O   RH TYPING  Positive     External Prenatal Results       Pregnancy Outside Results - Transcribed From Office Records - See Scanned Records For Details       Test Value Date Time    ABO  O  10/07/24 1438    Rh  Positive  10/07/24 1438    Antibody Screen  Negative  10/07/24 1438       Negative  24 1040       Negative  04/15/24 1536    Varicella IgG       Rubella  5.08 index 04/15/24 1536    Hgb  10.4 g/dL 10/09/24 0507       11.6 g/dL 10/07/24 1438       10.3 g/dL 24 1040       12.6 g/dL 24 1549       11.6 g/dL 04/15/24 1536       12.2 g/dL 24 1252    Hct  30.0 % 10/09/24 0507       33.0 % 10/07/24 1438       29.8 % 24 1040        37.0 % 04/26/24 1549       34.6 % 04/15/24 1536       36.6 % 03/14/24 1252    HgB A1c        1h GTT  78 mg/dL 08/07/24 1040    3h GTT Fasting       3h GTT 1 hour       3h GTT 2 hour       3h GTT 3 hour        Gonorrhea (discrete)  Negative  04/15/24 1536    Chlamydia (discrete)  Negative  04/15/24 1536    RPR  Non Reactive  08/07/24 1040       Non Reactive  04/15/24 1536    Syphils cascade: TP-Ab (FTA)  Non-Reactive  10/07/24 1438    TP-Ab  Non-Reactive  10/07/24 1438    TP-Ab (EIA)       TPPA       HBsAg  Negative  04/15/24 1536    Herpes Simplex Virus PCR       Herpes Simplex VIrus Culture       HIV  Non Reactive  04/15/24 1536    Hep C RNA Quant PCR       Hep C Antibody  Non Reactive  04/15/24 1536    AFP       NIPT       Cystic Fibroisis        Group B Strep  No Group B Streptococcus isolated  10/01/24 1808    GBS Susceptibility to Clindamycin       GBS Susceptibility to Erythromycin       Fetal Fibronectin       Genetic Testing, Maternal Blood                 Drug Screening       Test Value Date Time    Urine Drug Screen       Amphetamine Screen  Negative  10/07/24 1430    Barbiturate Screen  Negative  10/07/24 1430    Benzodiazepine Screen  Negative  10/07/24 1430    Methadone Screen  Negative  10/07/24 1430    Phencyclidine Screen  Negative  10/07/24 1430    Opiates Screen  Negative  10/07/24 1430    THC Screen  Negative  10/07/24 1430    Cocaine Screen       Propoxyphene Screen       Buprenorphine Screen  Negative  10/07/24 1430    Methamphetamine Screen       Oxycodone Screen  Negative  10/07/24 1430    Tricyclic Antidepressants Screen  Negative  10/07/24 1430              Legend    ^: Historical                            Assessment/Plan:      PPD#1 s/p vaginal delivery. Doing well.  Asymptomatic anemia. VSS.     Plan:     Continue routine PP care. Advance.     Mariela Vo, APRN  10/09/24

## 2024-10-09 NOTE — LACTATION NOTE
Per floor RN infant currently in NICU OBS. Mom is exhausted and sleeping. I set hospital pump, wash basin, soap, sterile syringes, and NICU teaching packet outside pt. Room. When I peaked in pt. Was sleeping.  Floor RN will take in next time she goes in and setup and instruct on use if pt. Up to starting to pump tonight. Educated we like to have them start pumping within 6 hours after delivery.

## 2024-10-10 VITALS
DIASTOLIC BLOOD PRESSURE: 65 MMHG | HEART RATE: 84 BPM | RESPIRATION RATE: 18 BRPM | SYSTOLIC BLOOD PRESSURE: 128 MMHG | TEMPERATURE: 98.7 F

## 2024-10-10 PROBLEM — Z34.90 PREGNANCY: Status: RESOLVED | Noted: 2024-10-07 | Resolved: 2024-10-10

## 2024-10-10 RX ADMIN — DOCUSATE SODIUM 100 MG: 100 CAPSULE, LIQUID FILLED ORAL at 09:36

## 2024-10-10 NOTE — PLAN OF CARE
Goal Outcome Evaluation:           Progress: improving  Outcome Evaluation: VSS stable, patient ambulating

## 2024-10-10 NOTE — DISCHARGE SUMMARY
Discharge Summary    Date of Admission: 10/7/2024  Date of Discharge:  10/10/2024      Patient: Carol Ann Dominique      MR#:[id number]    Delivery Provider: Isis Rodas     Attending Provider: Isis Rodas MD    Presenting Problem/History of Present Illness  Pregnancy [Z34.90]       Maternal anemia in pregnancy, antepartum    Uterine size-date discrepancy in third trimester     (spontaneous vaginal delivery)         Discharge Diagnosis: Vaginal delivery at 37w1d    Procedures:  Vaginal, Spontaneous     10/8/2024    5:45 PM        Discharge Date: 10/10/2024;     Hospital Course  Patient is a 18 y.o. female  at 37w1d status post vaginal delivery without complication. Postpartum the patient did well. She remained afebrile, with vital signs stable. She was ready for discharge on postpartum day 2.     Infant:   male  fetus 2955 g (6 lb 8.2 oz)  with Apgar scores of 9 , 9  at five minutes.    Condition on Discharge:  Stable    Vital Signs  Temp:  [97.5 °F (36.4 °C)-98.7 °F (37.1 °C)] 98.7 °F (37.1 °C)  Heart Rate:  [68-93] 84  Resp:  [16-18] 18  BP: (105-128)/(62-65) 128/65    Lab Results   Component Value Date    WBC 15.50 (H) 10/09/2024    HGB 10.4 (L) 10/09/2024    HCT 30.0 (L) 10/09/2024    MCV 92.0 10/09/2024     10/09/2024       Discharge Disposition  Home or Self Care    Discharge Medications     Discharge Medications        Continue These Medications        Instructions Start Date   fluticasone 50 MCG/ACT nasal spray  Commonly known as: Flonase   2 sprays, Nasal, Daily      IRON PO   Oral      prenatal (CLASSIC) vitamin 28-0.8 MG tablet tablet  Generic drug: prenatal vitamin   Oral, Daily      TUMS PO   Oral      VITAMIN D PO   Oral      ZyrTEC Allergy 10 MG tablet  Generic drug: cetirizine              ASK your doctor about these medications        Instructions Start Date   NON FORMULARY   Scutellania supreme, reishi mushroom comlex, theanine, disgetive enzyme                Discharge Diet:   Diet Instructions       Diet: Regular/House Diet; Thin (IDDSI 0)      Discharge Diet: Regular/House Diet    Fluid Consistency: Thin (IDDSI 0)            Activity at Discharge:   Activity Instructions       Bathing Restrictions      Type of Restriction:  Bathing  Sex       Explain Sexual Activity Restrictions: 6 weeks    Bathing Restrictions: No Tub Bath    Pelvic Rest              Follow-up Appointments  Future Appointments   Date Time Provider Department Center   11/18/2024  3:00 PM Isis Rodas MD [facility]     Additional Instructions for the Follow-ups that You Need to Schedule       Call MD With Problems / Concerns   As directed      Instructions: Monitor for excessive bleeding >1 pad/hr for several hours, dizziness, syncope, fever or changes in blood pressure. Call with foul smelling discharge, fever of >100.4, signs of postpartum depression, saturating a pad in <1 hour, blood clots larger than a golf ball. Also, call with headache that does not resolve with medication or rest, vision changes, pain in right upper quadrant, worsening swelling, or BP >140/90 if able to monitor at home.    Order Comments: Instructions: Monitor for excessive bleeding >1 pad/hr for several hours, dizziness, syncope, fever or changes in blood pressure. Call with foul smelling discharge, fever of >100.4, signs of postpartum depression, saturating a pad in <1 hour, blood clots larger than a golf ball. Also, call with headache that does not resolve with medication or rest, vision changes, pain in right upper quadrant, worsening swelling, or BP >140/90 if able to monitor at home.         Discharge Follow-up with Specified Provider: ; 6 Weeks   As directed      To:    Follow Up: 6 Weeks                NATASHA Ramsey  10/10/24  10:10 EDT  Csd

## 2024-10-13 ENCOUNTER — HOSPITAL ENCOUNTER (OUTPATIENT)
Facility: HOSPITAL | Age: 19
Discharge: HOME | End: 2024-10-14
Attending: OBSTETRICS & GYNECOLOGY | Admitting: OBSTETRICS & GYNECOLOGY
Payer: COMMERCIAL

## 2024-10-13 LAB
ALP SERPL-CCNC: 171 U/L (ref 39–117)
ALT SERPL W P-5'-P-CCNC: 17 U/L (ref 1–33)
AST SERPL-CCNC: 18 U/L (ref 1–32)
BASOPHILS # BLD AUTO: 0.03 10*3/MM3 (ref 0–0.2)
BASOPHILS NFR BLD AUTO: 0.3 % (ref 0–1.5)
BILIRUB SERPL-MCNC: 0.2 MG/DL (ref 0–1.2)
CREAT SERPL-MCNC: 0.54 MG/DL (ref 0.57–1)
DEPRECATED RDW RBC AUTO: 40.8 FL (ref 37–54)
EOSINOPHIL # BLD AUTO: 0.5 10*3/MM3 (ref 0–0.4)
EOSINOPHIL NFR BLD AUTO: 5.7 % (ref 0.3–6.2)
ERYTHROCYTE [DISTWIDTH] IN BLOOD BY AUTOMATED COUNT: 12.2 % (ref 12.3–15.4)
HCT VFR BLD AUTO: 33.5 % (ref 34–46.6)
HGB BLD-MCNC: 11.7 G/DL (ref 12–15.9)
IMM GRANULOCYTES # BLD AUTO: 0.08 10*3/MM3 (ref 0–0.05)
IMM GRANULOCYTES NFR BLD AUTO: 0.9 % (ref 0–0.5)
LDH SERPL-CCNC: 200 U/L (ref 135–214)
LYMPHOCYTES # BLD AUTO: 2.31 10*3/MM3 (ref 0.7–3.1)
LYMPHOCYTES NFR BLD AUTO: 26.5 % (ref 19.6–45.3)
MCH RBC QN AUTO: 31.9 PG (ref 26.6–33)
MCHC RBC AUTO-ENTMCNC: 34.9 G/DL (ref 31.5–35.7)
MCV RBC AUTO: 91.3 FL (ref 79–97)
MONOCYTES # BLD AUTO: 0.61 10*3/MM3 (ref 0.1–0.9)
MONOCYTES NFR BLD AUTO: 7 % (ref 5–12)
NEUTROPHILS NFR BLD AUTO: 5.19 10*3/MM3 (ref 1.7–7)
NEUTROPHILS NFR BLD AUTO: 59.6 % (ref 42.7–76)
NRBC BLD AUTO-RTO: 0 /100 WBC (ref 0–0.2)
PLATELET # BLD AUTO: 275 10*3/MM3 (ref 140–450)
PMV BLD AUTO: 9.7 FL (ref 6–12)
RBC # BLD AUTO: 3.67 10*6/MM3 (ref 3.77–5.28)
URATE SERPL-MCNC: 4.9 MG/DL (ref 2.4–5.7)
WBC NRBC COR # BLD AUTO: 8.72 10*3/MM3 (ref 3.4–10.8)

## 2024-10-13 PROCEDURE — 84550 ASSAY OF BLOOD/URIC ACID: CPT | Performed by: OBSTETRICS & GYNECOLOGY

## 2024-10-13 PROCEDURE — 99213 OFFICE O/P EST LOW 20 MIN: CPT | Performed by: OBSTETRICS & GYNECOLOGY

## 2024-10-13 PROCEDURE — 84450 TRANSFERASE (AST) (SGOT): CPT | Performed by: OBSTETRICS & GYNECOLOGY

## 2024-10-13 PROCEDURE — 83615 LACTATE (LD) (LDH) ENZYME: CPT | Performed by: OBSTETRICS & GYNECOLOGY

## 2024-10-13 PROCEDURE — G0463 HOSPITAL OUTPT CLINIC VISIT: HCPCS

## 2024-10-13 PROCEDURE — 82247 BILIRUBIN TOTAL: CPT | Performed by: OBSTETRICS & GYNECOLOGY

## 2024-10-13 PROCEDURE — 84075 ASSAY ALKALINE PHOSPHATASE: CPT | Performed by: OBSTETRICS & GYNECOLOGY

## 2024-10-13 PROCEDURE — 85025 COMPLETE CBC W/AUTO DIFF WBC: CPT | Performed by: OBSTETRICS & GYNECOLOGY

## 2024-10-13 PROCEDURE — 84460 ALANINE AMINO (ALT) (SGPT): CPT | Performed by: OBSTETRICS & GYNECOLOGY

## 2024-10-13 PROCEDURE — 36415 COLL VENOUS BLD VENIPUNCTURE: CPT | Performed by: OBSTETRICS & GYNECOLOGY

## 2024-10-13 PROCEDURE — 82565 ASSAY OF CREATININE: CPT | Performed by: OBSTETRICS & GYNECOLOGY

## 2024-10-13 RX ORDER — IBUPROFEN 800 MG/1
800 TABLET, FILM COATED ORAL ONCE
Status: COMPLETED | OUTPATIENT
Start: 2024-10-14 | End: 2024-10-13

## 2024-10-13 RX ADMIN — IBUPROFEN 800 MG: 800 TABLET, FILM COATED ORAL at 23:33

## 2024-10-14 VITALS
OXYGEN SATURATION: 98 % | HEART RATE: 63 BPM | TEMPERATURE: 97.8 F | DIASTOLIC BLOOD PRESSURE: 77 MMHG | RESPIRATION RATE: 18 BRPM | SYSTOLIC BLOOD PRESSURE: 111 MMHG

## 2024-10-14 NOTE — PROGRESS NOTES
Carol Ann Dominique  [id number]  2005      All BP's are normal.  Labs are normal  P/1)Motrin 800mg po     2)d/c back to NICU (home)    Phillip Jerez MD  10/13/2024  23:30 EDT

## 2024-10-14 NOTE — H&P
Carol Ann Dominique  2005  [id number]  [id number]    CC: headache  HPI:  Patient is 18 y.o. female   s/p  10/8/2024 presents with c/o headache.  Pt has been staying with her baby in NICU since d/c.  Pt says HA started day after delivery and has been constant since then varying from mild to moderate.  Pt denies visual changes or abd pain.  Pt is breast and bottle feeding, bleeding is scant.  No hx HTN during preg or in postpartum period    PMH:  Current meds: PNV, Tyl  Illnesses: none  Surgeries: eye surg, oral surg  Allergies: NKDA    Past OB History:       OB History    Para Term  AB Living   1 1 1 0 0 1   SAB IAB Ectopic Molar Multiple Live Births   0 0 0 0 0 1      # Outcome Date GA Lbr [place]/2nd Weight Sex Type Anes PTL Lv   1 Term 10/08/24 37w1d 25:53 / 00:51 2955 g (6 lb 8.2 oz) M Vag-Spont EPI N ALEXANDRA      Name: Hiram Dominique      Apgar1: 9  Apgar5: 9            SH: tob neg , EtOH neg, drugs nge      General ROS: HARRELL.   All other systems reviewed and are negative.      Physical Examination: General appearance - alert, well appearing, and in no distress  Vital signs - /69   Pulse 65   Temp 97.8 °F (36.6 °C) (Oral)   Resp 18   LMP 2024   SpO2 98%   Neuro: A and O X 3, Cr nn 2-12 intact, motor 5/5, sensory normal, gait normal  HEENT: normocephalic, atraumatic,oropharynx clear, appearance of ears and nose normal  Neck - supple, no significant adenopathy, no thyromegaly  Lymphatics - no palpable lymphadenopathy in the neck or groin, no hepatosplenomegaly  Chest - clear to auscultation, no wheezes, rales or rhonchi, respiratory effort non-labored  Heart - normal rate, regular rhythm, no murmurs, rubs, clicks or gallops, no JVD, no lower extremity edema  Abdomen - soft, nontender, nondistended, no masses, no hepatosplenomegaly  no rebound tenderness noted, bowel sounds normal  Extremities - no pedal edema noted, no calf tenderness  Skin -warm and dry, normal coloration  and turgor, no rashes, no suspicious skin lesions noted      Assessment 1)PPD #5 from  with persistent HA.  BP's normal, exam     unrevealing    Plan 1)observe   2)CBC, preeclampsia panel   3)pt unsure if she wants anything else for HARRELL.    Phillip Jerez MD  10/13/2024  22:45 EDT                                                                      denies

## 2024-10-27 ENCOUNTER — PATIENT MESSAGE (OUTPATIENT)
Dept: OBSTETRICS AND GYNECOLOGY | Facility: CLINIC | Age: 19
End: 2024-10-27
Payer: COMMERCIAL

## 2024-10-28 RX ORDER — DICLOXACILLIN SODIUM 500 MG/1
500 CAPSULE ORAL 4 TIMES DAILY
Qty: 40 CAPSULE | Refills: 0 | Status: SHIPPED | OUTPATIENT
Start: 2024-10-28 | End: 2024-10-28 | Stop reason: SDUPTHER

## 2024-10-28 RX ORDER — DICLOXACILLIN SODIUM 500 MG/1
500 CAPSULE ORAL 4 TIMES DAILY
Qty: 40 CAPSULE | Refills: 0 | Status: SHIPPED | OUTPATIENT
Start: 2024-10-28

## 2024-11-18 ENCOUNTER — POSTPARTUM VISIT (OUTPATIENT)
Dept: OBSTETRICS AND GYNECOLOGY | Facility: CLINIC | Age: 19
End: 2024-11-18
Payer: COMMERCIAL

## 2024-11-18 VITALS
HEIGHT: 64 IN | BODY MASS INDEX: 25.1 KG/M2 | WEIGHT: 147 LBS | DIASTOLIC BLOOD PRESSURE: 70 MMHG | SYSTOLIC BLOOD PRESSURE: 110 MMHG

## 2024-11-18 PROBLEM — O99.019 MATERNAL ANEMIA IN PREGNANCY, ANTEPARTUM: Status: RESOLVED | Noted: 2024-08-08 | Resolved: 2024-11-18

## 2024-11-18 PROBLEM — Z34.90 PRENATAL CARE: Status: RESOLVED | Noted: 2024-05-13 | Resolved: 2024-11-18

## 2024-11-18 PROBLEM — O26.843 UTERINE SIZE-DATE DISCREPANCY IN THIRD TRIMESTER: Status: RESOLVED | Noted: 2024-08-22 | Resolved: 2024-11-18

## 2024-11-18 NOTE — PROGRESS NOTES
Chief Complaint   Patient presents with    Postpartum Care       Postpartum Visit         Carol Ann Dominique is a 18 y.o.  who presents today for a 6 week(s) postpartum check.     C/S: no     Vaginal, Spontaneous   Information for the patient's :  Sherif Morrow [[id number]]   10/8/2024   male   Sherif Morrow   2955 g (6 lb 8.2 oz)   Gestational Age: 37w1d       Baby Discharged: To NICU for 8 days  Delivering Physician: Isis Rodas MD    Her pregnancy was complicated by   decreased fetal movement and 3 variable decelerations on NST, AC at 5% . The laceration was Small left periurethral  degree and is healing well. Patient describes vaginal bleeding as absent.  Patient is breastfeeding.  She desires  none  for contraception.      She would like to discuss the following complaints today: Pt wants to see if her iron level is back up and check eosinophils. Pt states when she got epidural, her right knee went numb and currently she states she does not have same sensation in right knee as she does in left.     Patient denies concerns for postpartum depression/anxiety. Patient denies  suicidal or homicidal ideation. Her postpartum depression screening questionnaire: 5. No treatment is indicated      Last Pap : n/a  Last Completed Pap Smear       This patient has no relevant Health Maintenance data.              The additional following portions of the patient's history were reviewed and updated as appropriate: allergies, current medications, past family history, past medical history, past social history, past surgical history, and problem list.    Review of Systems   Constitutional: Negative.    HENT: Negative.     Eyes: Negative.    Respiratory: Negative.     Cardiovascular: Negative.    Gastrointestinal: Negative.    Endocrine: Negative.    Genitourinary: Negative.    Musculoskeletal: Negative.    Skin: Negative.    Allergic/Immunologic: Negative.    Hematological: Negative.   "  Psychiatric/Behavioral: Negative.       All other systems reviewed and are negative.     I have reviewed and agree with the HPI, ROS, and historical information as entered above. Isis Rodas MD      /70   Ht 162.6 cm (64\")   Wt 66.7 kg (147 lb)   LMP 2024   Breastfeeding Yes   BMI 25.23 kg/m²     Physical Exam  Vitals and nursing note reviewed. Exam conducted with a chaperone present.   Constitutional:       Appearance: She is well-developed.   HENT:      Head: Normocephalic and atraumatic.   Pulmonary:      Effort: Pulmonary effort is normal.   Abdominal:      Palpations: Abdomen is soft. Abdomen is not rigid.   Genitourinary:     Vagina: No lesions or prolapsed vaginal walls.      Cervix: No lesion or erythema.      Uterus: Enlarged. Not tender and no uterine prolapse.       Adnexa:         Right: No mass, tenderness or fullness.          Left: No mass, tenderness or fullness.     Musculoskeletal:      Cervical back: Normal range of motion.   Neurological:      Mental Status: She is alert and oriented to person, place, and time.   Psychiatric:         Mood and Affect: Mood normal.         Behavior: Behavior normal.             Assessment and Plan    Problem List Items Addressed This Visit          Gravid and      (spontaneous vaginal delivery)    Overview     10/8/24  boy Rola Green#8oz          Other Visit Diagnoses       Postpartum follow-up    -  Primary    Anemia of mother in pregnancy, postpartum condition        Relevant Orders    CBC & Differential            S/p Vaginal delivery, 6 week(s) postpartum.  Doing well.    Return to normal physical activity.  No pelvic restrictions.   Baby doing well.  Contraception: contraceptive methods: None  Return in about 1 year (around 2025) for Annual physical.     Isis Rodas MD  2024   "

## 2024-12-10 ENCOUNTER — LAB (OUTPATIENT)
Dept: LAB | Facility: HOSPITAL | Age: 19
End: 2024-12-10
Payer: COMMERCIAL

## 2024-12-10 PROCEDURE — 36415 COLL VENOUS BLD VENIPUNCTURE: CPT

## 2024-12-10 PROCEDURE — 85025 COMPLETE CBC W/AUTO DIFF WBC: CPT

## 2024-12-11 LAB
BASOPHILS # BLD AUTO: 0.05 10*3/MM3 (ref 0–0.2)
BASOPHILS NFR BLD AUTO: 0.9 % (ref 0–1.5)
DEPRECATED RDW RBC AUTO: 37.8 FL (ref 37–54)
EOSINOPHIL # BLD AUTO: 0.73 10*3/MM3 (ref 0–0.4)
EOSINOPHIL NFR BLD AUTO: 13.4 % (ref 0.3–6.2)
ERYTHROCYTE [DISTWIDTH] IN BLOOD BY AUTOMATED COUNT: 11.9 % (ref 12.3–15.4)
HCT VFR BLD AUTO: 37.8 % (ref 34–46.6)
HGB BLD-MCNC: 13.2 G/DL (ref 12–15.9)
IMM GRANULOCYTES # BLD AUTO: 0.01 10*3/MM3 (ref 0–0.05)
IMM GRANULOCYTES NFR BLD AUTO: 0.2 % (ref 0–0.5)
LYMPHOCYTES # BLD AUTO: 1.86 10*3/MM3 (ref 0.7–3.1)
LYMPHOCYTES NFR BLD AUTO: 34.3 % (ref 19.6–45.3)
MCH RBC QN AUTO: 31.1 PG (ref 26.6–33)
MCHC RBC AUTO-ENTMCNC: 34.9 G/DL (ref 31.5–35.7)
MCV RBC AUTO: 88.9 FL (ref 79–97)
MONOCYTES # BLD AUTO: 0.38 10*3/MM3 (ref 0.1–0.9)
MONOCYTES NFR BLD AUTO: 7 % (ref 5–12)
NEUTROPHILS NFR BLD AUTO: 2.4 10*3/MM3 (ref 1.7–7)
NEUTROPHILS NFR BLD AUTO: 44.2 % (ref 42.7–76)
NRBC BLD AUTO-RTO: 0 /100 WBC (ref 0–0.2)
PLATELET # BLD AUTO: 337 10*3/MM3 (ref 140–450)
PMV BLD AUTO: 10.1 FL (ref 6–12)
RBC # BLD AUTO: 4.25 10*6/MM3 (ref 3.77–5.28)
WBC NRBC COR # BLD AUTO: 5.43 10*3/MM3 (ref 3.4–10.8)

## 2025-01-09 ENCOUNTER — TRANSCRIBE ORDERS (OUTPATIENT)
Dept: LAB | Facility: HOSPITAL | Age: 20
End: 2025-01-09
Payer: COMMERCIAL

## 2025-01-09 ENCOUNTER — LAB (OUTPATIENT)
Dept: LAB | Facility: HOSPITAL | Age: 20
End: 2025-01-09
Payer: COMMERCIAL

## 2025-01-09 DIAGNOSIS — H69.90 DYSFUNCTION OF EUSTACHIAN TUBE, UNSPECIFIED LATERALITY: ICD-10-CM

## 2025-01-09 DIAGNOSIS — Z91.018 ALLERGY TO OTHER FOODS: ICD-10-CM

## 2025-01-09 DIAGNOSIS — J30.89 NON-SEASONAL ALLERGIC RHINITIS, UNSPECIFIED TRIGGER: ICD-10-CM

## 2025-01-09 DIAGNOSIS — K20.0 EOSINOPHILIC ESOPHAGITIS: ICD-10-CM

## 2025-01-09 DIAGNOSIS — J30.81 ALLERGIC RHINITIS DUE TO ANIMAL (CAT) (DOG) HAIR AND DANDER: ICD-10-CM

## 2025-01-09 DIAGNOSIS — R06.00 DYSPNEA, UNSPECIFIED TYPE: ICD-10-CM

## 2025-01-09 DIAGNOSIS — R06.00 DYSPNEA, UNSPECIFIED TYPE: Primary | ICD-10-CM

## 2025-01-09 DIAGNOSIS — J30.1 ALLERGIC RHINITIS DUE TO POLLEN, UNSPECIFIED SEASONALITY: ICD-10-CM

## 2025-01-09 PROCEDURE — 82785 ASSAY OF IGE: CPT

## 2025-01-09 PROCEDURE — 86008 ALLG SPEC IGE RECOMB EA: CPT

## 2025-01-09 PROCEDURE — 86003 ALLG SPEC IGE CRUDE XTRC EA: CPT

## 2025-01-09 PROCEDURE — 36415 COLL VENOUS BLD VENIPUNCTURE: CPT

## 2025-01-14 LAB
CONV CLASS DESCRIPTION: ABNORMAL
IGE SERPL-ACNC: 148 IU/ML (ref 6–495)
PEANUT (RARA H) 1 IGE QN: <0.1 KU/L
PEANUT (RARA H) 2 IGE QN: <0.1 KU/L
PEANUT (RARA H) 3 IGE QN: <0.1 KU/L
PEANUT (RARA H) 6 IGE QN: <0.1 KU/L
PEANUT (RARA H) 8 IGE QN: <0.1 KU/L
PEANUT (RARA H) 9 IGE QN: 3.64 KU/L
PEANUT IGE QN: 1.43 KU/L
SESAME SEED IGE QN: 0.41 KU/L
SOYBEAN IGE QN: 0.24 KU/L

## 2025-04-28 ENCOUNTER — OFFICE VISIT (OUTPATIENT)
Dept: FAMILY MEDICINE CLINIC | Facility: CLINIC | Age: 20
End: 2025-04-28
Payer: COMMERCIAL

## 2025-04-28 VITALS
TEMPERATURE: 98 F | HEART RATE: 70 BPM | OXYGEN SATURATION: 98 % | HEIGHT: 64 IN | BODY MASS INDEX: 24.41 KG/M2 | SYSTOLIC BLOOD PRESSURE: 108 MMHG | DIASTOLIC BLOOD PRESSURE: 64 MMHG | WEIGHT: 143 LBS

## 2025-04-28 DIAGNOSIS — Z00.00 ANNUAL PHYSICAL EXAM: Primary | ICD-10-CM

## 2025-04-28 NOTE — PATIENT INSTRUCTIONS
Health Maintenance, Female  Adopting a healthy lifestyle and getting preventive care can go a long way to promote health and wellness. Talk with your health care provider about what schedule of regular examinations is right for you. This is a good chance for you to check in with your provider about disease prevention and staying healthy.  In between checkups, there are plenty of things you can do on your own. Experts have done a lot of research about which lifestyle changes and preventive measures are most likely to keep you healthy. Ask your health care provider for more information.  Weight and diet  Eat a healthy diet  Be sure to include plenty of vegetables, fruits, low-fat dairy products, and lean protein.  Do not eat a lot of foods high in solid fats, added sugars, or salt.  Get regular exercise. This is one of the most important things you can do for your health.  Most adults should exercise for at least 150 minutes each week. The exercise should increase your heart rate and make you sweat (moderate-intensity exercise).  Most adults should also do strengthening exercises at least twice a week. This is in addition to the moderate-intensity exercise.     Maintain a healthy weight  Body mass index (BMI) is a measurement that can be used to identify possible weight problems. It estimates body fat based on height and weight. Your health care provider can help determine your BMI and help you achieve or maintain a healthy weight.  For females 20 years of age and older:  A BMI below 18.5 is considered underweight.  A BMI of 18.5 to 24.9 is normal.  A BMI of 25 to 29.9 is considered overweight.  A BMI of 30 and above is considered obese.     Watch levels of cholesterol and blood lipids  You should start having your blood tested for lipids and cholesterol at 20 years of age, then have this test every 5 years.  You may need to have your cholesterol levels checked more often if:  Your lipid or cholesterol levels are  high.  You are older than 50 years of age.  You are at high risk for heart disease.     Cancer screening  Lung Cancer  Lung cancer screening is recommended for adults 55-80 years old who are at high risk for lung cancer because of a history of smoking.  A yearly low-dose CT scan of the lungs is recommended for people who:  Currently smoke.  Have quit within the past 15 years.  Have at least a 30-pack-year history of smoking. A pack year is smoking an average of one pack of cigarettes a day for 1 year.  Yearly screening should continue until it has been 15 years since you quit.  Yearly screening should stop if you develop a health problem that would prevent you from having lung cancer treatment.     Breast Cancer  Practice breast self-awareness. This means understanding how your breasts normally appear and feel.  It also means doing regular breast self-exams. Let your health care provider know about any changes, no matter how small.  If you are in your 20s or 30s, you should have a clinical breast exam (CBE) by a health care provider every 1-3 years as part of a regular health exam.  If you are 40 or older, have a CBE every year. Also consider having a breast X-ray (mammogram) every year.  If you have a family history of breast cancer, talk to your health care provider about genetic screening.  If you are at high risk for breast cancer, talk to your health care provider about having an MRI and a mammogram every year.  Breast cancer gene (BRCA) assessment is recommended for women who have family members with BRCA-related cancers. BRCA-related cancers include:  Breast.  Ovarian.  Tubal.  Peritoneal cancers.  Results of the assessment will determine the need for genetic counseling and BRCA1 and BRCA2 testing.     Cervical Cancer  Your health care provider may recommend that you be screened regularly for cancer of the pelvic organs (ovaries, uterus, and vagina). This screening involves a pelvic examination, including  checking for microscopic changes to the surface of your cervix (Pap test). You may be encouraged to have this screening done every 3 years, beginning at age 21.  For women ages 30-65, health care providers may recommend pelvic exams and Pap testing every 3 years, or they may recommend the Pap and pelvic exam, combined with testing for human papilloma virus (HPV), every 5 years. Some types of HPV increase your risk of cervical cancer. Testing for HPV may also be done on women of any age with unclear Pap test results.  Other health care providers may not recommend any screening for nonpregnant women who are considered low risk for pelvic cancer and who do not have symptoms. Ask your health care provider if a screening pelvic exam is right for you.  If you have had past treatment for cervical cancer or a condition that could lead to cancer, you need Pap tests and screening for cancer for at least 20 years after your treatment. If Pap tests have been discontinued, your risk factors (such as having a new sexual partner) need to be reassessed to determine if screening should resume. Some women have medical problems that increase the chance of getting cervical cancer. In these cases, your health care provider may recommend more frequent screening and Pap tests.     Colorectal Cancer  This type of cancer can be detected and often prevented.  Routine colorectal cancer screening usually begins at 50 years of age and continues through 75 years of age.  Your health care provider may recommend screening at an earlier age if you have risk factors for colon cancer.  Your health care provider may also recommend using home test kits to check for hidden blood in the stool.  A small camera at the end of a tube can be used to examine your colon directly (sigmoidoscopy or colonoscopy). This is done to check for the earliest forms of colorectal cancer.  Routine screening usually begins at age 50.  Direct examination of the colon should  be repeated every 5-10 years through 75 years of age. However, you may need to be screened more often if early forms of precancerous polyps or small growths are found.     Skin Cancer  Check your skin from head to toe regularly.  Tell your health care provider about any new moles or changes in moles, especially if there is a change in a mole's shape or color.  Also tell your health care provider if you have a mole that is larger than the size of a pencil eraser.  Always use sunscreen. Apply sunscreen liberally and repeatedly throughout the day.  Protect yourself by wearing long sleeves, pants, a wide-brimmed hat, and sunglasses whenever you are outside.     Heart disease, diabetes, and high blood pressure  High blood pressure causes heart disease and increases the risk of stroke. High blood pressure is more likely to develop in:  People who have blood pressure in the high end of the normal range (130-139/85-89 mm Hg).  People who are overweight or obese.  People who are .  If you are 18-39 years of age, have your blood pressure checked every 3-5 years. If you are 40 years of age or older, have your blood pressure checked every year. You should have your blood pressure measured twice--once when you are at a hospital or clinic, and once when you are not at a hospital or clinic. Record the average of the two measurements. To check your blood pressure when you are not at a hospital or clinic, you can use:  An automated blood pressure machine at a pharmacy.  A home blood pressure monitor.  If you are between 55 years and 79 years old, ask your health care provider if you should take aspirin to prevent strokes.  Have regular diabetes screenings. This involves taking a blood sample to check your fasting blood sugar level.  If you are at a normal weight and have a low risk for diabetes, have this test once every three years after 45 years of age.  If you are overweight and have a high risk for diabetes,  consider being tested at a younger age or more often.  Preventing infection  Hepatitis B  If you have a higher risk for hepatitis B, you should be screened for this virus. You are considered at high risk for hepatitis B if:  You were born in a country where hepatitis B is common. Ask your health care provider which countries are considered high risk.  Your parents were born in a high-risk country, and you have not been immunized against hepatitis B (hepatitis B vaccine).  You have HIV or AIDS.  You use needles to inject street drugs.  You live with someone who has hepatitis B.  You have had sex with someone who has hepatitis B.  You get hemodialysis treatment.  You take certain medicines for conditions, including cancer, organ transplantation, and autoimmune conditions.     Hepatitis C  Blood testing is recommended for:  Everyone born from 1945 through 1965.  Anyone with known risk factors for hepatitis C.     Sexually transmitted infections (STIs)  You should be screened for sexually transmitted infections (STIs) including gonorrhea and chlamydia if:  You are sexually active and are younger than 24 years of age.  You are older than 24 years of age and your health care provider tells you that you are at risk for this type of infection.  Your sexual activity has changed since you were last screened and you are at an increased risk for chlamydia or gonorrhea. Ask your health care provider if you are at risk.  If you do not have HIV, but are at risk, it may be recommended that you take a prescription medicine daily to prevent HIV infection. This is called pre-exposure prophylaxis (PrEP). You are considered at risk if:  You are sexually active and do not regularly use condoms or know the HIV status of your partner(s).  You take drugs by injection.  You are sexually active with a partner who has HIV.     Talk with your health care provider about whether you are at high risk of being infected with HIV. If you choose to  begin PrEP, you should first be tested for HIV. You should then be tested every 3 months for as long as you are taking PrEP.  Pregnancy  If you are premenopausal and you may become pregnant, ask your health care provider about preconception counseling.  If you may become pregnant, take 400 to 800 micrograms (mcg) of folic acid every day.  If you want to prevent pregnancy, talk to your health care provider about birth control (contraception).  Osteoporosis and menopause  Osteoporosis is a disease in which the bones lose minerals and strength with aging. This can result in serious bone fractures. Your risk for osteoporosis can be identified using a bone density scan.  If you are 65 years of age or older, or if you are at risk for osteoporosis and fractures, ask your health care provider if you should be screened.  Ask your health care provider whether you should take a calcium or vitamin D supplement to lower your risk for osteoporosis.  Menopause may have certain physical symptoms and risks.  Hormone replacement therapy may reduce some of these symptoms and risks.  Talk to your health care provider about whether hormone replacement therapy is right for you.  Follow these instructions at home:  Schedule regular health, dental, and eye exams.  Stay current with your immunizations.  Do not use any tobacco products including cigarettes, chewing tobacco, or electronic cigarettes.  If you are pregnant, do not drink alcohol.  If you are breastfeeding, limit how much and how often you drink alcohol.  Limit alcohol intake to no more than 1 drink per day for nonpregnant women. One drink equals 12 ounces of beer, 5 ounces of wine, or 1½ ounces of hard liquor.  Do not use street drugs.  Do not share needles.  Ask your health care provider for help if you need support or information about quitting drugs.  Tell your health care provider if you often feel depressed.  Tell your health care provider if you have ever been abused or do  not feel safe at home.  This information is not intended to replace advice given to you by your health care provider. Make sure you discuss any questions you have with your health care provider.  Document Released: 07/02/2012 Document Revised: 05/25/2017 Document Reviewed: 09/20/2016  [employer] Interactive Patient Education © 2018 [employer]

## 2025-04-28 NOTE — ASSESSMENT & PLAN NOTE
- Recommend diet and exercise: At least 30 minutes of exercise, 5 times per week and eating a variety of fresh fruits, vegetables, lean proteins limiting fatty food intake and carbohydrate intake  - Recommend annual dental and eye visits  - Additional information provided in AVS  - Obtaining basic screening labs today, will follow-up on results and advise further

## 2025-04-28 NOTE — PROGRESS NOTES
"    Office Note     Name: Carol Ann Dominique    : 2005     MRN: [id number]     Chief Complaint  Annual Exam (Post pregnancy physical )    Subjective     History of Present Illness:  Carol Ann Dominique is a 19 y.o. female who presents today for annual physical.  Patient is 6 to 7 months postpartum.  She is currently breast-feeding.  She has no concerns today.  She could feel little bit lightheaded sometimes.  She would like to get her blood work checked today.        Objective     Past Medical History:   Diagnosis Date    Allergic     Anemia     Eosinophilic esophagitis     GERD (gastroesophageal reflux disease) 2 years ago    Pregnancy 2024     Past Surgical History:   Procedure Laterality Date    LASIK Bilateral 2023    WISDOM TOOTH EXTRACTION       Family History   Problem Relation Age of Onset    No Known Problems Father     Other Mother         Gallbladder issues    Diabetes Paternal Grandmother         Type 2    Hyperlipidemia Paternal Grandmother         She has high blood pressure, Her dad had high blood pressure as well and  from stroke    Heart disease Paternal Grandmother         Both of her parents also  from heart issues    Diabetes Maternal Grandmother         Type 2    Other Maternal Grandmother         Gallbladder    Cancer Maternal Grandfather         Prostate and his mom  from thyroid cancer    Prostate cancer Maternal Grandfather     Cancer Maternal Aunt         Thyroid Cancer    Kidney disease Brother     Breast cancer Neg Hx     Ovarian cancer Neg Hx     Uterine cancer Neg Hx     Colon cancer Neg Hx        Vital Signs  /64   Pulse 70   Temp 98 °F (36.7 °C) (Infrared)   Ht 162.6 cm (64.02\")   Wt 64.9 kg (143 lb)   SpO2 98%   BMI 24.53 kg/m²   Estimated body mass index is 24.53 kg/m² as calculated from the following:    Height as of this encounter: 162.6 cm (64.02\").    Weight as of this encounter: 64.9 kg (143 lb).    Physical Exam  Vitals reviewed. "   Constitutional:       Appearance: Normal appearance.   HENT:      Head: Normocephalic.      Right Ear: External ear normal.      Left Ear: External ear normal.      Nose: Nose normal.      Mouth/Throat:      Mouth: Mucous membranes are moist.   Eyes:      Extraocular Movements: Extraocular movements intact.   Cardiovascular:      Rate and Rhythm: Normal rate and regular rhythm.      Heart sounds: Normal heart sounds.   Pulmonary:      Effort: Pulmonary effort is normal. No respiratory distress.      Breath sounds: Normal breath sounds.   Abdominal:      General: Abdomen is flat.      Palpations: Abdomen is soft.   Musculoskeletal:      Cervical back: No rigidity.      Comments: Moving all extremities spontaneously   Skin:     General: Skin is warm and dry.   Neurological:      General: No focal deficit present.      Mental Status: She is alert and oriented to person, place, and time.   Psychiatric:         Mood and Affect: Mood normal.         Behavior: Behavior normal.               Assessment and Plan     Diagnoses and all orders for this visit:    1. Annual physical exam (Primary)  Assessment & Plan:  - Recommend diet and exercise: At least 30 minutes of exercise, 5 times per week and eating a variety of fresh fruits, vegetables, lean proteins limiting fatty food intake and carbohydrate intake  - Recommend annual dental and eye visits  - Additional information provided in AVS  - Obtaining basic screening labs today, will follow-up on results and advise further    Orders:  -     TSH Rfx On Abnormal To Free T4; Future  -     Lipid Panel; Future  -     Hemoglobin A1c; Future  -     Vitamin D,25-Hydroxy; Future  -     Vitamin B12; Future  -     Comprehensive Metabolic Panel; Future  -     CBC & Differential; Future      BMI is within normal parameters. No other follow-up for BMI required.    Follow Up  Return in about 1 year (around 4/28/2026) for Annual.    Lee Ann Fenton MD

## 2025-04-30 ENCOUNTER — LAB (OUTPATIENT)
Dept: LAB | Facility: HOSPITAL | Age: 20
End: 2025-04-30
Payer: COMMERCIAL

## 2025-04-30 DIAGNOSIS — Z00.00 ANNUAL PHYSICAL EXAM: ICD-10-CM

## 2025-04-30 LAB
25(OH)D3 SERPL-MCNC: 24.8 NG/ML (ref 30–100)
ALBUMIN SERPL-MCNC: 4.6 G/DL (ref 3.5–5.2)
ALBUMIN/GLOB SERPL: 1.5 G/DL
ALP SERPL-CCNC: 112 U/L (ref 39–117)
ALT SERPL W P-5'-P-CCNC: 11 U/L (ref 1–33)
ANION GAP SERPL CALCULATED.3IONS-SCNC: 10.6 MMOL/L (ref 5–15)
AST SERPL-CCNC: 15 U/L (ref 1–32)
BASOPHILS # BLD AUTO: 0.06 10*3/MM3 (ref 0–0.2)
BASOPHILS NFR BLD AUTO: 1.2 % (ref 0–1.5)
BILIRUB SERPL-MCNC: 0.3 MG/DL (ref 0–1.2)
BUN SERPL-MCNC: 9 MG/DL (ref 6–20)
BUN/CREAT SERPL: 13 (ref 7–25)
CALCIUM SPEC-SCNC: 9.6 MG/DL (ref 8.6–10.5)
CHLORIDE SERPL-SCNC: 104 MMOL/L (ref 98–107)
CHOLEST SERPL-MCNC: 152 MG/DL (ref 0–200)
CO2 SERPL-SCNC: 23.4 MMOL/L (ref 22–29)
CREAT SERPL-MCNC: 0.69 MG/DL (ref 0.57–1)
DEPRECATED RDW RBC AUTO: 38.7 FL (ref 37–54)
EGFRCR SERPLBLD CKD-EPI 2021: 127.6 ML/MIN/1.73
EOSINOPHIL # BLD AUTO: 0.58 10*3/MM3 (ref 0–0.4)
EOSINOPHIL NFR BLD AUTO: 11.8 % (ref 0.3–6.2)
ERYTHROCYTE [DISTWIDTH] IN BLOOD BY AUTOMATED COUNT: 12.7 % (ref 12.3–15.4)
GLOBULIN UR ELPH-MCNC: 3 GM/DL
GLUCOSE SERPL-MCNC: 74 MG/DL (ref 65–99)
HBA1C MFR BLD: 5.3 % (ref 4.8–5.6)
HCT VFR BLD AUTO: 37.7 % (ref 34–46.6)
HDLC SERPL-MCNC: 58 MG/DL (ref 40–60)
HGB BLD-MCNC: 12.8 G/DL (ref 12–15.9)
IMM GRANULOCYTES # BLD AUTO: 0.01 10*3/MM3 (ref 0–0.05)
IMM GRANULOCYTES NFR BLD AUTO: 0.2 % (ref 0–0.5)
LDLC SERPL CALC-MCNC: 85 MG/DL (ref 0–100)
LDLC/HDLC SERPL: 1.49 {RATIO}
LYMPHOCYTES # BLD AUTO: 1.85 10*3/MM3 (ref 0.7–3.1)
LYMPHOCYTES NFR BLD AUTO: 37.8 % (ref 19.6–45.3)
MCH RBC QN AUTO: 29.2 PG (ref 26.6–33)
MCHC RBC AUTO-ENTMCNC: 34 G/DL (ref 31.5–35.7)
MCV RBC AUTO: 85.9 FL (ref 79–97)
MONOCYTES # BLD AUTO: 0.35 10*3/MM3 (ref 0.1–0.9)
MONOCYTES NFR BLD AUTO: 7.1 % (ref 5–12)
NEUTROPHILS NFR BLD AUTO: 2.05 10*3/MM3 (ref 1.7–7)
NEUTROPHILS NFR BLD AUTO: 41.9 % (ref 42.7–76)
NRBC BLD AUTO-RTO: 0 /100 WBC (ref 0–0.2)
PLATELET # BLD AUTO: 279 10*3/MM3 (ref 140–450)
PMV BLD AUTO: 10.7 FL (ref 6–12)
POTASSIUM SERPL-SCNC: 5 MMOL/L (ref 3.5–5.2)
PROT SERPL-MCNC: 7.6 G/DL (ref 6–8.5)
RBC # BLD AUTO: 4.39 10*6/MM3 (ref 3.77–5.28)
SODIUM SERPL-SCNC: 138 MMOL/L (ref 136–145)
TRIGL SERPL-MCNC: 38 MG/DL (ref 0–150)
TSH SERPL DL<=0.05 MIU/L-ACNC: 1.62 UIU/ML (ref 0.27–4.2)
VIT B12 BLD-MCNC: 482 PG/ML (ref 211–946)
VLDLC SERPL-MCNC: 9 MG/DL (ref 5–40)
WBC NRBC COR # BLD AUTO: 4.9 10*3/MM3 (ref 3.4–10.8)

## 2025-04-30 PROCEDURE — 80050 GENERAL HEALTH PANEL: CPT

## 2025-04-30 PROCEDURE — 80061 LIPID PANEL: CPT

## 2025-04-30 PROCEDURE — 82607 VITAMIN B-12: CPT

## 2025-04-30 PROCEDURE — 82306 VITAMIN D 25 HYDROXY: CPT

## 2025-04-30 PROCEDURE — 83036 HEMOGLOBIN GLYCOSYLATED A1C: CPT

## 2025-06-26 ENCOUNTER — OFFICE VISIT (OUTPATIENT)
Dept: FAMILY MEDICINE CLINIC | Facility: CLINIC | Age: 20
End: 2025-06-26
Payer: COMMERCIAL

## 2025-06-26 VITALS
OXYGEN SATURATION: 98 % | TEMPERATURE: 98.6 F | HEIGHT: 64 IN | DIASTOLIC BLOOD PRESSURE: 78 MMHG | SYSTOLIC BLOOD PRESSURE: 138 MMHG | BODY MASS INDEX: 23.9 KG/M2 | RESPIRATION RATE: 20 BRPM | WEIGHT: 140 LBS | HEART RATE: 77 BPM

## 2025-06-26 DIAGNOSIS — H66.001 NON-RECURRENT ACUTE SUPPURATIVE OTITIS MEDIA OF RIGHT EAR WITHOUT SPONTANEOUS RUPTURE OF TYMPANIC MEMBRANE: Primary | ICD-10-CM

## 2025-06-26 RX ORDER — NEOMYCIN SULFATE, POLYMYXIN B SULFATE, HYDROCORTISONE 3.5; 10000; 1 MG/ML; [USP'U]/ML; MG/ML
3 SOLUTION/ DROPS AURICULAR (OTIC) 3 TIMES DAILY
Qty: 10 ML | Refills: 0 | Status: SHIPPED | OUTPATIENT
Start: 2025-06-26

## 2025-06-26 NOTE — PROGRESS NOTES
Follow Up Office Visit      Patient Name: Carol Ann Dominique  : 2005   MRN: [id number]     Chief Complaint:  Earache (Right)     History of Present Illness: Carol Ann Dominique is a 19 y.o. female who is here today for acute right ear pain.      History of Present Illness  The patient is a 19-year-old female who presents with right ear pain.    She began experiencing severe pain in her right ear on Tuesday night, which has been causing discomfort during meals. The pain is so intense that it disrupts her sleep and prevents her from lying on the affected side. She reports no fever or drainage from the ear. She recalls previous episodes of ear drainage without associated pain.      Subjective     Subjective          The following portions of the patient's history were reviewed and updated as appropriate: allergies, current medications, past family history, past medical history, past social history, past surgical history and problem list.    Allergy:   Allergies   Allergen Reactions    Peanut-Containing Drug Products Angioedema    Soybean-Containing Drug Products GI Intolerance        Current Medications:   Current Outpatient Medications   Medication Sig Dispense Refill    fluticasone (Flonase) 50 MCG/ACT nasal spray 2 sprays into the nostril(s) as directed by provider Daily. 16 g 5    prenatal vitamin (prenatal, CLASSIC, vitamin) tablet Take  by mouth Daily.      VITAMIN D PO Take  by mouth.      amoxicillin-clavulanate (AUGMENTIN) 875-125 MG per tablet Take 1 tablet by mouth 2 (Two) Times a Day. 14 tablet 0    neomycin-polymyxin-hydrocortisone (CORTISPORIN) 1 % solution otic solution Administer 3 drops to the right ear 3 (Three) Times a Day. 10 mL 0     No current facility-administered medications for this visit.       Objective     Objective     Physical Exam:  Physical Exam  Vitals and nursing note reviewed.   Constitutional:       Appearance: Normal appearance.   HENT:      Head: Normocephalic and  "atraumatic.      Right Ear: Tympanic membrane is erythematous.      Left Ear: Tympanic membrane and ear canal normal.      Mouth/Throat:      Mouth: Mucous membranes are moist.   Eyes:      Pupils: Pupils are equal, round, and reactive to light.   Cardiovascular:      Rate and Rhythm: Normal rate and regular rhythm.      Heart sounds: Normal heart sounds.   Pulmonary:      Effort: Pulmonary effort is normal.      Breath sounds: Normal breath sounds.   Abdominal:      General: Bowel sounds are normal.      Palpations: Abdomen is soft.   Musculoskeletal:         General: Normal range of motion.      Cervical back: Normal range of motion.   Skin:     General: Skin is warm and dry.   Neurological:      General: No focal deficit present.      Mental Status: She is alert and oriented to person, place, and time.   Psychiatric:         Mood and Affect: Mood normal.         Behavior: Behavior normal.         Vital Signs:   /78 (BP Location: Right arm, Patient Position: Standing, Cuff Size: Adult)   Pulse 77   Temp 98.6 °F (37 °C) (Temporal)   Resp 20   Ht 162.6 cm (64.02\")   Wt 63.5 kg (140 lb)   SpO2 98%   BMI 24.02 kg/m²            PHQ-9 Score  PHQ-9 Total Score:      Lab Review  Lab on 04/30/2025   Component Date Value Ref Range Status    TSH 04/30/2025 1.620  0.270 - 4.200 uIU/mL Final    Total Cholesterol 04/30/2025 152  0 - 200 mg/dL Final    Triglycerides 04/30/2025 38  0 - 150 mg/dL Final    HDL Cholesterol 04/30/2025 58  40 - 60 mg/dL Final    LDL Cholesterol  04/30/2025 85  0 - 100 mg/dL Final    VLDL Cholesterol 04/30/2025 9  5 - 40 mg/dL Final    LDL/HDL Ratio 04/30/2025 1.49   Final    Hemoglobin A1C 04/30/2025 5.30  4.80 - 5.60 % Final    25 Hydroxy, Vitamin D 04/30/2025 24.8 (L)  30.0 - 100.0 ng/ml Final    Vitamin B-12 04/30/2025 482  211 - 946 pg/mL Final    Glucose 04/30/2025 74  65 - 99 mg/dL Final    BUN 04/30/2025 9  6 - 20 mg/dL Final    Creatinine 04/30/2025 0.69  0.57 - 1.00 mg/dL Final "    Sodium 04/30/2025 138  136 - 145 mmol/L Final    Potassium 04/30/2025 5.0  3.5 - 5.2 mmol/L Final    Chloride 04/30/2025 104  98 - 107 mmol/L Final    CO2 04/30/2025 23.4  22.0 - 29.0 mmol/L Final    Calcium 04/30/2025 9.6  8.6 - 10.5 mg/dL Final    Total Protein 04/30/2025 7.6  6.0 - 8.5 g/dL Final    Albumin 04/30/2025 4.6  3.5 - 5.2 g/dL Final    ALT (SGPT) 04/30/2025 11  1 - 33 U/L Final    AST (SGOT) 04/30/2025 15  1 - 32 U/L Final    Alkaline Phosphatase 04/30/2025 112  39 - 117 U/L Final    Total Bilirubin 04/30/2025 0.3  0.0 - 1.2 mg/dL Final    Globulin 04/30/2025 3.0  gm/dL Final    A/G Ratio 04/30/2025 1.5  g/dL Final    BUN/Creatinine Ratio 04/30/2025 13.0  7.0 - 25.0 Final    Anion Gap 04/30/2025 10.6  5.0 - 15.0 mmol/L Final    eGFR 04/30/2025 127.6  >60.0 mL/min/1.73 Final    WBC 04/30/2025 4.90  3.40 - 10.80 10*3/mm3 Final    RBC 04/30/2025 4.39  3.77 - 5.28 10*6/mm3 Final    Hemoglobin 04/30/2025 12.8  12.0 - 15.9 g/dL Final    Hematocrit 04/30/2025 37.7  34.0 - 46.6 % Final    MCV 04/30/2025 85.9  79.0 - 97.0 fL Final    MCH 04/30/2025 29.2  26.6 - 33.0 pg Final    MCHC 04/30/2025 34.0  31.5 - 35.7 g/dL Final    RDW 04/30/2025 12.7  12.3 - 15.4 % Final    RDW-SD 04/30/2025 38.7  37.0 - 54.0 fl Final    MPV 04/30/2025 10.7  6.0 - 12.0 fL Final    Platelets 04/30/2025 279  140 - 450 10*3/mm3 Final    Neutrophil % 04/30/2025 41.9 (L)  42.7 - 76.0 % Final    Lymphocyte % 04/30/2025 37.8  19.6 - 45.3 % Final    Monocyte % 04/30/2025 7.1  5.0 - 12.0 % Final    Eosinophil % 04/30/2025 11.8 (H)  0.3 - 6.2 % Final    Basophil % 04/30/2025 1.2  0.0 - 1.5 % Final    Immature Grans % 04/30/2025 0.2  0.0 - 0.5 % Final    Neutrophils, Absolute 04/30/2025 2.05  1.70 - 7.00 10*3/mm3 Final    Lymphocytes, Absolute 04/30/2025 1.85  0.70 - 3.10 10*3/mm3 Final    Monocytes, Absolute 04/30/2025 0.35  0.10 - 0.90 10*3/mm3 Final    Eosinophils, Absolute 04/30/2025 0.58 (H)  0.00 - 0.40 10*3/mm3 Final    Basophils,  Absolute 04/30/2025 0.06  0.00 - 0.20 10*3/mm3 Final    Immature Grans, Absolute 04/30/2025 0.01  0.00 - 0.05 10*3/mm3 Final    nRBC 04/30/2025 0.0  0.0 - 0.2 /100 WBC Final        Radiology Results        Assessment / Plan         Assessment and Plan       Diagnoses and all orders for this visit:    1. Non-recurrent acute suppurative otitis media of right ear without spontaneous rupture of tympanic membrane (Primary)  Assessment & Plan:  - Reports right ear pain starting 06/24/2025, exacerbated by eating, and severe enough to disrupt sleep.  - No drainage or fever noted. Physical examination reveals pain upon insertion of the otoscope.  - Augmentin 875 mg twice daily for 7 days has been prescribed.  eardrops have been prescribed for pain relief.      Orders:  -     amoxicillin-clavulanate (AUGMENTIN) 875-125 MG per tablet; Take 1 tablet by mouth 2 (Two) Times a Day.  Dispense: 14 tablet; Refill: 0  -     neomycin-polymyxin-hydrocortisone (CORTISPORIN) 1 % solution otic solution; Administer 3 drops to the right ear 3 (Three) Times a Day.  Dispense: 10 mL; Refill: 0        BMI is within normal parameters. No other follow-up for BMI required.       Health Maintenance  Health Maintenance:   Health Maintenance Due   Topic Date Due    HPV VACCINES (1 - 3-dose series) Never done    MENINGOCOCCAL B VACCINE (1 of 2 - Standard) Never done    TDAP/TD VACCINES (1 - Tdap) Never done    COVID-19 Vaccine (1 - 2024-25 season) Never done    CHLAMYDIA SCREENING  04/15/2025        Meds ordered during this visit  New Medications Ordered This Visit   Medications    amoxicillin-clavulanate (AUGMENTIN) 875-125 MG per tablet     Sig: Take 1 tablet by mouth 2 (Two) Times a Day.     Dispense:  14 tablet     Refill:  0    neomycin-polymyxin-hydrocortisone (CORTISPORIN) 1 % solution otic solution     Sig: Administer 3 drops to the right ear 3 (Three) Times a Day.     Dispense:  10 mL     Refill:  0       Meds stopped during this visit:  There  are no discontinued medications.     Patient was given instructions and counseling regarding her condition or for health maintenance advice. Please see specific information pulled into the AVS if appropriate.     Follow Up   No follow-ups on file.    Karena Gonzalez DO  [facility]     Dictated Utilizing Dragon Dictation: Part of this note may be an electronic transcription/translation of spoken language to printed text using the Dragon Dictation System.    Patient or patient representative verbalized consent for the use of Ambient Listening during the visit with  Karena Gonzalez DO for chart documentation. 6/26/2025  15:32 EDT      This document has been electronically signed by Karena Gonzalez DO   June 26, 2025 15:32 EDT

## 2025-06-26 NOTE — ASSESSMENT & PLAN NOTE
- Reports right ear pain starting 06/24/2025, exacerbated by eating, and severe enough to disrupt sleep.  - No drainage or fever noted. Physical examination reveals pain upon insertion of the otoscope.  - Augmentin 875 mg twice daily for 7 days has been prescribed.  eardrops have been prescribed for pain relief.